# Patient Record
Sex: MALE | Race: WHITE | NOT HISPANIC OR LATINO | URBAN - METROPOLITAN AREA
[De-identification: names, ages, dates, MRNs, and addresses within clinical notes are randomized per-mention and may not be internally consistent; named-entity substitution may affect disease eponyms.]

---

## 2019-06-28 ENCOUNTER — OFFICE VISIT (OUTPATIENT)
Dept: INTERNAL MEDICINE | Facility: CLINIC | Age: 39
End: 2019-06-28
Payer: COMMERCIAL

## 2019-06-28 VITALS
BODY MASS INDEX: 33 KG/M2 | HEART RATE: 102 BPM | WEIGHT: 235.69 LBS | HEIGHT: 71 IN | OXYGEN SATURATION: 96 % | TEMPERATURE: 98 F | SYSTOLIC BLOOD PRESSURE: 128 MMHG | DIASTOLIC BLOOD PRESSURE: 80 MMHG

## 2019-06-28 DIAGNOSIS — F98.8 ATTENTION DEFICIT DISORDER, UNSPECIFIED HYPERACTIVITY PRESENCE: Primary | ICD-10-CM

## 2019-06-28 PROCEDURE — 3008F PR BODY MASS INDEX (BMI) DOCUMENTED: ICD-10-PCS | Mod: CPTII,S$GLB,, | Performed by: FAMILY MEDICINE

## 2019-06-28 PROCEDURE — 3008F BODY MASS INDEX DOCD: CPT | Mod: CPTII,S$GLB,, | Performed by: FAMILY MEDICINE

## 2019-06-28 PROCEDURE — 99999 PR PBB SHADOW E&M-EST. PATIENT-LVL III: ICD-10-PCS | Mod: PBBFAC,,, | Performed by: FAMILY MEDICINE

## 2019-06-28 PROCEDURE — 99203 PR OFFICE/OUTPT VISIT, NEW, LEVL III, 30-44 MIN: ICD-10-PCS | Mod: S$GLB,,, | Performed by: FAMILY MEDICINE

## 2019-06-28 PROCEDURE — 99999 PR PBB SHADOW E&M-EST. PATIENT-LVL III: CPT | Mod: PBBFAC,,, | Performed by: FAMILY MEDICINE

## 2019-06-28 PROCEDURE — 99203 OFFICE O/P NEW LOW 30 MIN: CPT | Mod: S$GLB,,, | Performed by: FAMILY MEDICINE

## 2019-06-28 RX ORDER — DEXTROAMPHETAMINE SULFATE 10 MG/1
10 CAPSULE, EXTENDED RELEASE ORAL DAILY
Qty: 60 CAPSULE | Refills: 0 | Status: SHIPPED | OUTPATIENT
Start: 2019-06-28 | End: 2019-07-17 | Stop reason: SDUPTHER

## 2019-06-28 RX ORDER — DEXTROAMPHETAMINE SULFATE 10 MG/1
CAPSULE, EXTENDED RELEASE ORAL
Refills: 0 | COMMUNITY
Start: 2019-05-09 | End: 2019-06-28 | Stop reason: SDUPTHER

## 2019-06-28 NOTE — PROGRESS NOTES
Subjective:       Patient ID: Theron Manrique is a 39 y.o. male.    Chief Complaint: Establish Care    Establish Care:      Pt is a 39 year old who is here for ADD check. Pt in Dexedrine 10 mg. Pt does not take     Review of Systems   Constitutional: Negative for activity change and unexpected weight change.   HENT: Negative for hearing loss, rhinorrhea and trouble swallowing.    Eyes: Negative for discharge and visual disturbance.   Respiratory: Negative for chest tightness and wheezing.    Cardiovascular: Negative for chest pain and palpitations.   Gastrointestinal: Negative for blood in stool, constipation, diarrhea and vomiting.   Endocrine: Negative for polydipsia and polyuria.   Genitourinary: Negative for difficulty urinating, hematuria and urgency.   Musculoskeletal: Negative for arthralgias, joint swelling and neck pain.   Neurological: Negative for weakness and headaches.   Psychiatric/Behavioral: Negative for confusion and dysphoric mood.       Objective:      Physical Exam   Constitutional: He is oriented to person, place, and time. He appears well-developed and well-nourished.   Cardiovascular: Normal rate and regular rhythm.   No murmur heard.  Pulmonary/Chest: Effort normal and breath sounds normal. No stridor. He has no wheezes.   Abdominal: Soft. Bowel sounds are normal.   Neurological: He is alert and oriented to person, place, and time.   Skin: Skin is warm and dry.   Psychiatric: He has a normal mood and affect. His behavior is normal.       Assessment:       1. Attention deficit disorder, unspecified hyperactivity presence        Plan:       Attention deficit disorder, unspecified hyperactivity presence  Comments:  Will continue with the Dexedrube 10 mg bid    Other orders  -     dextroamphetamine (DEXEDRINE SPANSULE) 10 MG 24 hr capsule; Take 1 capsule (10 mg total) by mouth once daily.  Dispense: 60 capsule; Refill: 0

## 2019-07-01 ENCOUNTER — TELEPHONE (OUTPATIENT)
Dept: INTERNAL MEDICINE | Facility: CLINIC | Age: 39
End: 2019-07-01

## 2019-07-01 NOTE — TELEPHONE ENCOUNTER
----- Message from Estefany Goode sent at 7/1/2019 10:00 AM CDT -----  Contact: Self  Type:  Patient Returning Call    Who Called:Theron  Who Left Message for Patient:Stas  Does the patient know what this is regarding?:  Would the patient rather a call back or a response via [facility]? call  Best Call Back Number:[phone]  Additional Information:

## 2019-07-01 NOTE — TELEPHONE ENCOUNTER
----- Message from Sanam Wiley sent at 7/1/2019  9:06 AM CDT -----  Contact: Pt   Pt is calling regarding requesting to have nurse call pt back. Pt states that call is concnenring medication  dextroamphetamine (DEXEDRINE SPANSULE) 10 MG 24 hr capsule. Pt states that medication is suppose to be written for twice a day, but was sent to pharmacy to be taken once a daily. Pt states that pharmacy advised pt that script is not covered by insurance less written for twice a day. ..[phone] (home)     [facility] [id number] [place] [id number] [employer] AT [facility]  [address] [employer] [address]  Phone: [phone] Fax: [phone]        .Thank You  Ary Wiley

## 2019-07-08 ENCOUNTER — TELEPHONE (OUTPATIENT)
Dept: INTERNAL MEDICINE | Facility: CLINIC | Age: 39
End: 2019-07-08

## 2019-07-08 NOTE — TELEPHONE ENCOUNTER
----- Message from Scottie Hunter sent at 7/8/2019  9:16 AM CDT -----  Contact: [phone]  Pt would like a call back from nurse regarding confirmation on a prescription . Please call back at [phone].     Thank You,   Scottie Hunter

## 2019-07-09 ENCOUNTER — TELEPHONE (OUTPATIENT)
Dept: INTERNAL MEDICINE | Facility: CLINIC | Age: 39
End: 2019-07-09

## 2019-07-09 NOTE — TELEPHONE ENCOUNTER
----- Message from Annemarie Ramirez sent at 7/9/2019  3:58 PM CDT -----  Contact: pt  The pt states he needs a script for Dexedrine 10mg 2x daily sent to [facility], no additional info given and can be reached at [phone]///thxMW

## 2019-07-09 NOTE — TELEPHONE ENCOUNTER
I s/w pt in regards to medication confirmation, pt informed me that his pharmacy faxed over paperwork to confirm instructions, I informed pt I would check fax and give pharmacy a call or fax them back information. Pt thanked me for calling. //BJ

## 2019-07-17 ENCOUNTER — TELEPHONE (OUTPATIENT)
Dept: INTERNAL MEDICINE | Facility: CLINIC | Age: 39
End: 2019-07-17

## 2019-07-17 RX ORDER — DEXTROAMPHETAMINE SULFATE 10 MG/1
10 CAPSULE, EXTENDED RELEASE ORAL 2 TIMES DAILY
Qty: 60 CAPSULE | Refills: 0 | Status: SHIPPED | OUTPATIENT
Start: 2019-07-17 | End: 2019-08-14 | Stop reason: SDUPTHER

## 2019-07-17 NOTE — TELEPHONE ENCOUNTER
Patient also reported the rx is written incorrectly. He has always taken this med twice a day, not once daily.    Informed patient I would check with Dr. Flores about revising rx.

## 2019-07-17 NOTE — TELEPHONE ENCOUNTER
Per [facility], no prior auth necessary for the following if quantity dispensed is changed to 34 capsules or less and by prescribing physician and member utilizes [facility] according to member's benefit plan:        Thanked [facility] and ended call.

## 2019-07-17 NOTE — TELEPHONE ENCOUNTER
----- Message from Annemarie Ramirez sent at 7/17/2019 11:47 AM CDT -----  Contact: pt  Type:  Patient Returning Call    Who Called: The pt  Who Left Message for Patient: Hermes  Does the patient know what this is regarding?: no  Would the patient rather a call back or a response via [facility]? Call back  Best Call Back Number: [phone]  Additional Information: n/a

## 2019-07-17 NOTE — TELEPHONE ENCOUNTER
Left voicemail for patient to call back to let me know if he has separate pharmacy benefits card to complete prior auth.

## 2019-08-14 NOTE — TELEPHONE ENCOUNTER
Confirmed patient's preferred pharmacy, allergies, and current medications as per Epic.    Forwarded to Dr. Flores for review.

## 2019-08-15 RX ORDER — DEXTROAMPHETAMINE SULFATE 10 MG/1
10 CAPSULE, EXTENDED RELEASE ORAL 2 TIMES DAILY
Qty: 60 CAPSULE | Refills: 0 | Status: SHIPPED | OUTPATIENT
Start: 2019-08-15 | End: 2019-09-11 | Stop reason: SDUPTHER

## 2019-09-06 ENCOUNTER — LAB VISIT (OUTPATIENT)
Dept: LAB | Facility: HOSPITAL | Age: 39
End: 2019-09-06
Attending: FAMILY MEDICINE
Payer: COMMERCIAL

## 2019-09-06 ENCOUNTER — OFFICE VISIT (OUTPATIENT)
Dept: INTERNAL MEDICINE | Facility: CLINIC | Age: 39
End: 2019-09-06
Payer: COMMERCIAL

## 2019-09-06 VITALS
TEMPERATURE: 97 F | DIASTOLIC BLOOD PRESSURE: 74 MMHG | WEIGHT: 236.31 LBS | HEART RATE: 74 BPM | HEIGHT: 71 IN | SYSTOLIC BLOOD PRESSURE: 108 MMHG | BODY MASS INDEX: 33.08 KG/M2 | OXYGEN SATURATION: 98 %

## 2019-09-06 DIAGNOSIS — Z00.00 ANNUAL PHYSICAL EXAM: ICD-10-CM

## 2019-09-06 DIAGNOSIS — Z00.00 ANNUAL PHYSICAL EXAM: Primary | ICD-10-CM

## 2019-09-06 LAB
ALBUMIN SERPL BCP-MCNC: 4.5 G/DL (ref 3.5–5.2)
ALP SERPL-CCNC: 50 U/L (ref 55–135)
ALT SERPL W/O P-5'-P-CCNC: 33 U/L (ref 10–44)
ANION GAP SERPL CALC-SCNC: 10 MMOL/L (ref 8–16)
AST SERPL-CCNC: 13 U/L (ref 10–40)
BASOPHILS # BLD AUTO: 0.04 K/UL (ref 0–0.2)
BASOPHILS NFR BLD: 0.7 % (ref 0–1.9)
BILIRUB SERPL-MCNC: 1.4 MG/DL (ref 0.1–1)
BUN SERPL-MCNC: 16 MG/DL (ref 6–20)
CALCIUM SERPL-MCNC: 10.2 MG/DL (ref 8.7–10.5)
CHLORIDE SERPL-SCNC: 104 MMOL/L (ref 95–110)
CHOLEST SERPL-MCNC: 157 MG/DL (ref 120–199)
CHOLEST/HDLC SERPL: 3.7 {RATIO} (ref 2–5)
CO2 SERPL-SCNC: 26 MMOL/L (ref 23–29)
CREAT SERPL-MCNC: 1 MG/DL (ref 0.5–1.4)
DIFFERENTIAL METHOD: NORMAL
EOSINOPHIL # BLD AUTO: 0.1 K/UL (ref 0–0.5)
EOSINOPHIL NFR BLD: 1.9 % (ref 0–8)
ERYTHROCYTE [DISTWIDTH] IN BLOOD BY AUTOMATED COUNT: 12.9 % (ref 11.5–14.5)
EST. GFR  (AFRICAN AMERICAN): >60 ML/MIN/1.73 M^2
EST. GFR  (NON AFRICAN AMERICAN): >60 ML/MIN/1.73 M^2
GLUCOSE SERPL-MCNC: 81 MG/DL (ref 70–110)
HCT VFR BLD AUTO: 45.7 % (ref 40–54)
HDLC SERPL-MCNC: 42 MG/DL (ref 40–75)
HDLC SERPL: 26.8 % (ref 20–50)
HGB BLD-MCNC: 14.9 G/DL (ref 14–18)
IMM GRANULOCYTES # BLD AUTO: 0.02 K/UL (ref 0–0.04)
IMM GRANULOCYTES NFR BLD AUTO: 0.4 % (ref 0–0.5)
LDLC SERPL CALC-MCNC: 89.4 MG/DL (ref 63–159)
LYMPHOCYTES # BLD AUTO: 2.4 K/UL (ref 1–4.8)
LYMPHOCYTES NFR BLD: 41.5 % (ref 18–48)
MCH RBC QN AUTO: 28.2 PG (ref 27–31)
MCHC RBC AUTO-ENTMCNC: 32.6 G/DL (ref 32–36)
MCV RBC AUTO: 87 FL (ref 82–98)
MONOCYTES # BLD AUTO: 0.6 K/UL (ref 0.3–1)
MONOCYTES NFR BLD: 10.2 % (ref 4–15)
NEUTROPHILS # BLD AUTO: 2.6 K/UL (ref 1.8–7.7)
NEUTROPHILS NFR BLD: 45.3 % (ref 38–73)
NONHDLC SERPL-MCNC: 115 MG/DL
NRBC BLD-RTO: 0 /100 WBC
PLATELET # BLD AUTO: 226 K/UL (ref 150–350)
PMV BLD AUTO: 10.7 FL (ref 9.2–12.9)
POTASSIUM SERPL-SCNC: 4.5 MMOL/L (ref 3.5–5.1)
PROT SERPL-MCNC: 7.4 G/DL (ref 6–8.4)
RBC # BLD AUTO: 5.28 M/UL (ref 4.6–6.2)
SODIUM SERPL-SCNC: 140 MMOL/L (ref 136–145)
TRIGL SERPL-MCNC: 128 MG/DL (ref 30–150)
WBC # BLD AUTO: 5.68 K/UL (ref 3.9–12.7)

## 2019-09-06 PROCEDURE — 99999 PR PBB SHADOW E&M-EST. PATIENT-LVL III: ICD-10-PCS | Mod: PBBFAC,,, | Performed by: FAMILY MEDICINE

## 2019-09-06 PROCEDURE — 36415 COLL VENOUS BLD VENIPUNCTURE: CPT

## 2019-09-06 PROCEDURE — 99395 PREV VISIT EST AGE 18-39: CPT | Mod: S$GLB,,, | Performed by: FAMILY MEDICINE

## 2019-09-06 PROCEDURE — 99395 PR PREVENTIVE VISIT,EST,18-39: ICD-10-PCS | Mod: S$GLB,,, | Performed by: FAMILY MEDICINE

## 2019-09-06 PROCEDURE — 80061 LIPID PANEL: CPT

## 2019-09-06 PROCEDURE — 99999 PR PBB SHADOW E&M-EST. PATIENT-LVL III: CPT | Mod: PBBFAC,,, | Performed by: FAMILY MEDICINE

## 2019-09-06 PROCEDURE — 80053 COMPREHEN METABOLIC PANEL: CPT

## 2019-09-06 PROCEDURE — 85025 COMPLETE CBC W/AUTO DIFF WBC: CPT

## 2019-09-06 NOTE — PROGRESS NOTES
Subjective:       Patient ID: Theron Manrique is a 39 y.o. male.    Chief Complaint: Follow-up (3 mon)    Annual Exam:      Pt is over all healthy. Pt does have ADD and is on dextroampetamine    Review of Systems   Constitutional: Negative.    Respiratory: Negative.    Cardiovascular: Negative.    Genitourinary: Negative.    Musculoskeletal: Negative.    Neurological: Negative.    Hematological: Negative.    Psychiatric/Behavioral: Negative.        Objective:      Physical Exam   Constitutional: He is oriented to person, place, and time. He appears well-developed and well-nourished.   Cardiovascular: Normal rate.   Pulmonary/Chest: Effort normal and breath sounds normal. No stridor. He has no wheezes.   Abdominal: Soft. Bowel sounds are normal. He exhibits no mass. There is no guarding.   Neurological: He is alert and oriented to person, place, and time.   Skin: Skin is warm and dry.   Psychiatric: He has a normal mood and affect.       Assessment:       1. Annual physical exam        Plan:       Annual physical exam  -     CBC auto differential; Future; Expected date: 09/06/2019  -     Comprehensive metabolic panel; Future; Expected date: 09/06/2019  -     Lipid panel; Future; Expected date: 09/06/2019

## 2019-09-12 RX ORDER — DEXTROAMPHETAMINE SULFATE 10 MG/1
10 CAPSULE, EXTENDED RELEASE ORAL 2 TIMES DAILY
Qty: 60 CAPSULE | Refills: 0 | Status: SHIPPED | OUTPATIENT
Start: 2019-09-12 | End: 2019-10-14 | Stop reason: SDUPTHER

## 2019-10-14 RX ORDER — DEXTROAMPHETAMINE SULFATE 10 MG/1
10 CAPSULE, EXTENDED RELEASE ORAL 2 TIMES DAILY
Qty: 60 CAPSULE | Refills: 0 | Status: SHIPPED | OUTPATIENT
Start: 2019-10-14 | End: 2019-11-13 | Stop reason: SDUPTHER

## 2019-11-13 ENCOUNTER — OFFICE VISIT (OUTPATIENT)
Dept: DERMATOLOGY | Facility: CLINIC | Age: 39
End: 2019-11-13
Payer: COMMERCIAL

## 2019-11-13 DIAGNOSIS — L98.9 SKIN LESION: Primary | ICD-10-CM

## 2019-11-13 PROCEDURE — 17110 PR DESTRUCTION BENIGN LESIONS UP TO 14: ICD-10-PCS | Mod: S$GLB,,, | Performed by: STUDENT IN AN ORGANIZED HEALTH CARE EDUCATION/TRAINING PROGRAM

## 2019-11-13 PROCEDURE — 99999 PR PBB SHADOW E&M-EST. PATIENT-LVL II: CPT | Mod: PBBFAC,,, | Performed by: STUDENT IN AN ORGANIZED HEALTH CARE EDUCATION/TRAINING PROGRAM

## 2019-11-13 PROCEDURE — 99999 PR PBB SHADOW E&M-EST. PATIENT-LVL II: ICD-10-PCS | Mod: PBBFAC,,, | Performed by: STUDENT IN AN ORGANIZED HEALTH CARE EDUCATION/TRAINING PROGRAM

## 2019-11-13 PROCEDURE — 99202 PR OFFICE/OUTPT VISIT, NEW, LEVL II, 15-29 MIN: ICD-10-PCS | Mod: 25,S$GLB,, | Performed by: STUDENT IN AN ORGANIZED HEALTH CARE EDUCATION/TRAINING PROGRAM

## 2019-11-13 PROCEDURE — 99202 OFFICE O/P NEW SF 15 MIN: CPT | Mod: 25,S$GLB,, | Performed by: STUDENT IN AN ORGANIZED HEALTH CARE EDUCATION/TRAINING PROGRAM

## 2019-11-13 PROCEDURE — 17110 DESTRUCTION B9 LES UP TO 14: CPT | Mod: S$GLB,,, | Performed by: STUDENT IN AN ORGANIZED HEALTH CARE EDUCATION/TRAINING PROGRAM

## 2019-11-13 NOTE — PATIENT INSTRUCTIONS

## 2019-11-13 NOTE — PROGRESS NOTES
Subjective:       Patient ID:  Theron Manrique is a 39 y.o. male who presents for   Chief Complaint   Patient presents with    Mole     to chest pt unsure how long it has been there.     History of Present Illness: The patient presents with chief complaint of a skin lesion.   Location: chest  Duration: unknown, recently noticed lesion weeks ago  Signs/Symptoms: none  Prior treatments: none  Denies and history of Family hx of skin cancer      Review of Systems   Skin: Negative for itching, rash and dry skin.        Objective:    Physical Exam   Constitutional: He appears well-developed and well-nourished. No distress.   Neurological: He is alert and oriented to person, place, and time. He is not disoriented.   Psychiatric: He has a normal mood and affect.   Skin:   Areas Examined (abnormalities noted in diagram):   Head / Face Inspection Performed  Neck Inspection Performed  Chest / Axilla Inspection Performed  Abdomen Inspection Performed  Back Inspection Performed  RUE Inspected  LUE Inspection Performed              Diagram Legend     Erythematous scaling macule/papule c/w actinic keratosis       Vascular papule c/w angioma      Pigmented verrucoid papule/plaque c/w seborrheic keratosis      Yellow umbilicated papule c/w sebaceous hyperplasia      Irregularly shaped tan macule c/w lentigo     1-2 mm smooth white papules consistent with Milia      Movable subcutaneous cyst with punctum c/w epidermal inclusion cyst      Subcutaneous movable cyst c/w pilar cyst      Firm pink to brown papule c/w dermatofibroma      Pedunculated fleshy papule(s) c/w skin tag(s)      Evenly pigmented macule c/w junctional nevus     Mildly variegated pigmented, slightly irregular-bordered macule c/w mildly atypical nevus      Flesh colored to evenly pigmented papule c/w intradermal nevus       Pink pearly papule/plaque c/w basal cell carcinoma      Erythematous hyperkeratotic cursted plaque c/w SCC      Surgical scar with no sign  of skin cancer recurrence      Open and closed comedones      Inflammatory papules and pustules      Verrucoid papule consistent consistent with wart     Erythematous eczematous patches and plaques     Dystrophic onycholytic nail with subungual debris c/w onychomycosis     Umbilicated papule    Erythematous-base heme-crusted tan verrucoid plaque consistent with inflamed seborrheic keratosis     Erythematous Silvery Scaling Plaque c/w Psoriasis     See annotation      Assessment / Plan:        Skin lesion  Cryosurgery procedure note:    Verbal consent from the patient is obtained including, but not limited to, risk of hypopigmentation/hyperpigmentation, scar, recurrence of lesion. Liquid nitrogen cryosurgery is applied to 1 lesions to produce a freeze injury. The patient is aware that blisters may form and is instructed on wound care with gentle cleansing and use of vaseline ointment to keep moist until healed. The patient is supplied a handout on cryosurgery and is instructed to call if lesions do not completely resolve.             Follow up if symptoms worsen or fail to improve.

## 2019-11-14 RX ORDER — DEXTROAMPHETAMINE SULFATE 10 MG/1
10 CAPSULE, EXTENDED RELEASE ORAL 2 TIMES DAILY
Qty: 60 CAPSULE | Refills: 0 | Status: SHIPPED | OUTPATIENT
Start: 2019-11-14 | End: 2019-12-12 | Stop reason: SDUPTHER

## 2019-12-09 PROBLEM — Z00.00 ANNUAL PHYSICAL EXAM: Status: RESOLVED | Noted: 2019-09-06 | Resolved: 2019-12-09

## 2019-12-13 RX ORDER — DEXTROAMPHETAMINE SULFATE 10 MG/1
10 CAPSULE, EXTENDED RELEASE ORAL 2 TIMES DAILY
Qty: 60 CAPSULE | Refills: 0 | Status: SHIPPED | OUTPATIENT
Start: 2019-12-13 | End: 2020-01-10 | Stop reason: SDUPTHER

## 2020-01-10 ENCOUNTER — OFFICE VISIT (OUTPATIENT)
Dept: INTERNAL MEDICINE | Facility: CLINIC | Age: 40
End: 2020-01-10
Payer: COMMERCIAL

## 2020-01-10 VITALS
WEIGHT: 241.63 LBS | OXYGEN SATURATION: 98 % | HEIGHT: 71 IN | TEMPERATURE: 98 F | DIASTOLIC BLOOD PRESSURE: 72 MMHG | BODY MASS INDEX: 33.83 KG/M2 | HEART RATE: 97 BPM | SYSTOLIC BLOOD PRESSURE: 122 MMHG

## 2020-01-10 DIAGNOSIS — F98.8 ATTENTION DEFICIT DISORDER, UNSPECIFIED HYPERACTIVITY PRESENCE: Primary | ICD-10-CM

## 2020-01-10 PROCEDURE — 99213 OFFICE O/P EST LOW 20 MIN: CPT | Mod: S$GLB,,, | Performed by: FAMILY MEDICINE

## 2020-01-10 PROCEDURE — 99213 PR OFFICE/OUTPT VISIT, EST, LEVL III, 20-29 MIN: ICD-10-PCS | Mod: S$GLB,,, | Performed by: FAMILY MEDICINE

## 2020-01-10 PROCEDURE — 3008F BODY MASS INDEX DOCD: CPT | Mod: CPTII,S$GLB,, | Performed by: FAMILY MEDICINE

## 2020-01-10 PROCEDURE — 99999 PR PBB SHADOW E&M-EST. PATIENT-LVL III: CPT | Mod: PBBFAC,,, | Performed by: FAMILY MEDICINE

## 2020-01-10 PROCEDURE — 99999 PR PBB SHADOW E&M-EST. PATIENT-LVL III: ICD-10-PCS | Mod: PBBFAC,,, | Performed by: FAMILY MEDICINE

## 2020-01-10 PROCEDURE — 3008F PR BODY MASS INDEX (BMI) DOCUMENTED: ICD-10-PCS | Mod: CPTII,S$GLB,, | Performed by: FAMILY MEDICINE

## 2020-01-10 RX ORDER — DEXTROAMPHETAMINE SULFATE 10 MG/1
10 CAPSULE, EXTENDED RELEASE ORAL 2 TIMES DAILY
Qty: 60 CAPSULE | Refills: 0 | Status: SHIPPED | OUTPATIENT
Start: 2020-01-10 | End: 2020-02-13 | Stop reason: SDUPTHER

## 2020-01-10 NOTE — PROGRESS NOTES
Subjective:       Patient ID: Theron Manrique is a 39 y.o. male.    Chief Complaint: Follow-up (4 mon)    F/U:     Pt is a 39 year old who is here for ADD eval. Pt is doing well. No issues.     Review of Systems   Constitutional: Negative.    HENT: Negative.    Respiratory: Negative.    Cardiovascular: Negative.    Gastrointestinal: Negative.    Genitourinary: Negative.    Skin: Negative.    Neurological: Negative.    Psychiatric/Behavioral: Negative.        Objective:      Physical Exam   Constitutional: He is oriented to person, place, and time. He appears well-developed and well-nourished.   Cardiovascular: Normal rate and regular rhythm. Exam reveals no friction rub.   No murmur heard.  Pulmonary/Chest: Effort normal and breath sounds normal. No stridor. He has no wheezes.   Abdominal: Soft. Bowel sounds are normal.   Neurological: He is alert and oriented to person, place, and time.   Psychiatric: He has a normal mood and affect. His behavior is normal.       Assessment:       1. Attention deficit disorder, unspecified hyperactivity presence        Plan:       Attention deficit disorder, unspecified hyperactivity presence  Comments:  Will continue with the Dexedrine    Other orders  -     dextroamphetamine (DEXEDRINE SPANSULE) 10 MG 24 hr capsule; Take 1 capsule (10 mg total) by mouth 2 (two) times daily.  Dispense: 60 capsule; Refill: 0

## 2020-02-13 RX ORDER — DEXTROAMPHETAMINE SULFATE 10 MG/1
10 CAPSULE, EXTENDED RELEASE ORAL 2 TIMES DAILY
Qty: 60 CAPSULE | Refills: 0 | Status: SHIPPED | OUTPATIENT
Start: 2020-02-13 | End: 2020-03-13 | Stop reason: SDUPTHER

## 2020-03-15 RX ORDER — DEXTROAMPHETAMINE SULFATE 10 MG/1
10 CAPSULE, EXTENDED RELEASE ORAL 2 TIMES DAILY
Qty: 60 CAPSULE | Refills: 0 | Status: SHIPPED | OUTPATIENT
Start: 2020-03-15 | End: 2020-04-14 | Stop reason: SDUPTHER

## 2020-04-14 RX ORDER — DEXTROAMPHETAMINE SULFATE 10 MG/1
10 CAPSULE, EXTENDED RELEASE ORAL 2 TIMES DAILY
Qty: 60 CAPSULE | Refills: 0 | Status: SHIPPED | OUTPATIENT
Start: 2020-04-14 | End: 2020-05-15 | Stop reason: SDUPTHER

## 2020-05-15 ENCOUNTER — OFFICE VISIT (OUTPATIENT)
Dept: INTERNAL MEDICINE | Facility: CLINIC | Age: 40
End: 2020-05-15
Payer: COMMERCIAL

## 2020-05-15 DIAGNOSIS — F98.8 ATTENTION DEFICIT DISORDER, UNSPECIFIED HYPERACTIVITY PRESENCE: Primary | ICD-10-CM

## 2020-05-15 PROCEDURE — 99213 PR OFFICE/OUTPT VISIT, EST, LEVL III, 20-29 MIN: ICD-10-PCS | Mod: 95,,, | Performed by: FAMILY MEDICINE

## 2020-05-15 PROCEDURE — 99213 OFFICE O/P EST LOW 20 MIN: CPT | Mod: 95,,, | Performed by: FAMILY MEDICINE

## 2020-05-15 RX ORDER — DEXTROAMPHETAMINE SULFATE 10 MG/1
10 CAPSULE, EXTENDED RELEASE ORAL 2 TIMES DAILY
Qty: 60 CAPSULE | Refills: 0 | Status: SHIPPED | OUTPATIENT
Start: 2020-05-15 | End: 2020-06-18 | Stop reason: SDUPTHER

## 2020-05-15 NOTE — PROGRESS NOTES
Subjective:       Patient ID: Threon Manrique is a 40 y.o. male.    Chief Complaint: No chief complaint on file.    The patient location is:Home  The chief complaint leading to consultation is: ADD  Visit type: ADD med  Total time spent with patient: 5 min  Each patient to whom he or she provides medical services by telemedicine is:  (1) informed of the relationship between the physician and patient and the respective role of any other health care provider with respect to management of the patient; and (2) notified that he or she may decline to receive medical services by telemedicine and may withdraw from such care at any time.    Notes:       Pt is a 40 year old seen for ADD medication refill. Pt is on Dexedrine and doing well.    Review of Systems   Constitutional: Negative.    Respiratory: Negative.    Cardiovascular: Negative.    Neurological: Negative.    Psychiatric/Behavioral: Negative.        Objective:      Physical Exam   Constitutional: He is oriented to person, place, and time. He appears well-developed and well-nourished.   Neurological: He is alert and oriented to person, place, and time.   Psychiatric: He has a normal mood and affect. His behavior is normal.       Assessment:       1. Attention deficit disorder, unspecified hyperactivity presence        Plan:       Attention deficit disorder, unspecified hyperactivity presence  Comments:  Continue with the Dexedrine    Other orders  -     dextroamphetamine (DEXEDRINE SPANSULE) 10 MG 24 hr capsule; Take 1 capsule (10 mg total) by mouth 2 (two) times daily.  Dispense: 60 capsule; Refill: 0       Consult Start Time: 05/15/2020 13:00  Consult End Time: 05/15/2020 13:05

## 2020-06-19 RX ORDER — DEXTROAMPHETAMINE SULFATE 10 MG/1
10 CAPSULE, EXTENDED RELEASE ORAL 2 TIMES DAILY
Qty: 60 CAPSULE | Refills: 0 | Status: SHIPPED | OUTPATIENT
Start: 2020-06-19 | End: 2020-07-17 | Stop reason: SDUPTHER

## 2020-07-17 RX ORDER — DEXTROAMPHETAMINE SULFATE 10 MG/1
10 CAPSULE, EXTENDED RELEASE ORAL 2 TIMES DAILY
Qty: 60 CAPSULE | Refills: 0 | Status: SHIPPED | OUTPATIENT
Start: 2020-07-17 | End: 2020-08-17 | Stop reason: SDUPTHER

## 2020-08-17 RX ORDER — DEXTROAMPHETAMINE SULFATE 10 MG/1
10 CAPSULE, EXTENDED RELEASE ORAL 2 TIMES DAILY
Qty: 60 CAPSULE | Refills: 0 | Status: SHIPPED | OUTPATIENT
Start: 2020-08-17 | End: 2020-09-18 | Stop reason: SDUPTHER

## 2020-09-18 ENCOUNTER — OFFICE VISIT (OUTPATIENT)
Dept: INTERNAL MEDICINE | Facility: CLINIC | Age: 40
End: 2020-09-18
Payer: COMMERCIAL

## 2020-09-18 DIAGNOSIS — F98.8 ATTENTION DEFICIT DISORDER, UNSPECIFIED HYPERACTIVITY PRESENCE: Primary | ICD-10-CM

## 2020-09-18 PROCEDURE — 99213 OFFICE O/P EST LOW 20 MIN: CPT | Mod: 95,,, | Performed by: FAMILY MEDICINE

## 2020-09-18 PROCEDURE — 99213 PR OFFICE/OUTPT VISIT, EST, LEVL III, 20-29 MIN: ICD-10-PCS | Mod: 95,,, | Performed by: FAMILY MEDICINE

## 2020-09-18 RX ORDER — DEXTROAMPHETAMINE SULFATE 10 MG/1
10 CAPSULE, EXTENDED RELEASE ORAL 2 TIMES DAILY
Qty: 60 CAPSULE | Refills: 0 | Status: SHIPPED | OUTPATIENT
Start: 2020-09-18 | End: 2020-10-15 | Stop reason: SDUPTHER

## 2020-09-18 NOTE — PROGRESS NOTES
Subjective:       Patient ID: Theron Manrique is a 40 y.o. male.    Chief Complaint: No chief complaint on file.    The patient location is: Home  The chief complaint leading to consultation is: med refill    Visit type: audiovisual    Face to Face time with patient: 10  minutes of total time spent on the encounter, which includes face to face time and non-face to face time preparing to see the patient (eg, review of tests), Obtaining and/or reviewing separately obtained history, Documenting clinical information in the electronic or other health record, Independently interpreting results (not separately reported) and communicating results to the patient/family/caregiver, or Care coordination (not separately reported).         Each patient to whom he or she provides medical services by telemedicine is:  (1) informed of the relationship between the physician and patient and the respective role of any other health care provider with respect to management of the patient; and (2) notified that he or she may decline to receive medical services by telemedicine and may withdraw from such care at any time.    Notes: Pt is a 40 year old with ADD and on Dexedrine 10 mg bid. Pt has done well. No evidence of abuse. Labs last year were good. Discussed transition in Dec    Review of Systems   Constitutional: Negative.  Negative for activity change and unexpected weight change.   HENT: Negative.  Negative for hearing loss, rhinorrhea and trouble swallowing.    Eyes: Negative for discharge and visual disturbance.   Respiratory: Negative.  Negative for chest tightness and wheezing.    Cardiovascular: Negative.  Negative for chest pain and palpitations.   Gastrointestinal: Negative.  Negative for blood in stool, constipation, diarrhea and vomiting.   Endocrine: Negative for polydipsia and polyuria.   Genitourinary: Negative for difficulty urinating, hematuria and urgency.   Musculoskeletal: Negative for arthralgias, joint swelling  and neck pain.   Integumentary:  Negative.   Neurological: Negative.  Negative for weakness and headaches.   Psychiatric/Behavioral: Negative.  Negative for confusion and dysphoric mood.         Objective:      Physical Exam  Constitutional:       Appearance: Normal appearance.   Neurological:      General: No focal deficit present.      Mental Status: He is alert and oriented to person, place, and time.         Assessment:       1. Attention deficit disorder, unspecified hyperactivity presence        Plan:       Attention deficit disorder, unspecified hyperactivity presence  Comments:  Continue with Dexedrine 10 mg bid    Other orders  -     dextroamphetamine (DEXEDRINE SPANSULE) 10 MG 24 hr capsule; Take 1 capsule (10 mg total) by mouth 2 (two) times a day.  Dispense: 60 capsule; Refill: 0

## 2020-10-15 RX ORDER — DEXTROAMPHETAMINE SULFATE 10 MG/1
10 CAPSULE, EXTENDED RELEASE ORAL 2 TIMES DAILY
Qty: 60 CAPSULE | Refills: 0 | Status: SHIPPED | OUTPATIENT
Start: 2020-10-15 | End: 2020-11-11 | Stop reason: SDUPTHER

## 2020-12-24 ENCOUNTER — LAB VISIT (OUTPATIENT)
Dept: LAB | Facility: HOSPITAL | Age: 40
End: 2020-12-24
Attending: FAMILY MEDICINE
Payer: COMMERCIAL

## 2020-12-24 ENCOUNTER — OFFICE VISIT (OUTPATIENT)
Dept: INTERNAL MEDICINE | Facility: CLINIC | Age: 40
End: 2020-12-24
Payer: COMMERCIAL

## 2020-12-24 VITALS
OXYGEN SATURATION: 97 % | SYSTOLIC BLOOD PRESSURE: 130 MMHG | WEIGHT: 249.31 LBS | BODY MASS INDEX: 34.9 KG/M2 | HEIGHT: 71 IN | TEMPERATURE: 99 F | HEART RATE: 104 BPM | DIASTOLIC BLOOD PRESSURE: 70 MMHG

## 2020-12-24 DIAGNOSIS — Z11.4 SCREENING FOR HIV (HUMAN IMMUNODEFICIENCY VIRUS): ICD-10-CM

## 2020-12-24 DIAGNOSIS — Z11.59 NEED FOR HEPATITIS C SCREENING TEST: ICD-10-CM

## 2020-12-24 DIAGNOSIS — F98.8 ATTENTION DEFICIT DISORDER, UNSPECIFIED HYPERACTIVITY PRESENCE: ICD-10-CM

## 2020-12-24 DIAGNOSIS — E66.9 OBESITY (BMI 30.0-34.9): ICD-10-CM

## 2020-12-24 DIAGNOSIS — Z00.00 ANNUAL PHYSICAL EXAM: Primary | ICD-10-CM

## 2020-12-24 DIAGNOSIS — Z00.00 ANNUAL PHYSICAL EXAM: ICD-10-CM

## 2020-12-24 PROBLEM — E66.811 OBESITY (BMI 30.0-34.9): Status: ACTIVE | Noted: 2020-12-24

## 2020-12-24 LAB
ALBUMIN SERPL BCP-MCNC: 4.3 G/DL (ref 3.5–5.2)
ALP SERPL-CCNC: 49 U/L (ref 55–135)
ALT SERPL W/O P-5'-P-CCNC: 45 U/L (ref 10–44)
ANION GAP SERPL CALC-SCNC: 9 MMOL/L (ref 8–16)
AST SERPL-CCNC: 13 U/L (ref 10–40)
BILIRUB SERPL-MCNC: 1.1 MG/DL (ref 0.1–1)
BUN SERPL-MCNC: 14 MG/DL (ref 6–20)
CALCIUM SERPL-MCNC: 9.3 MG/DL (ref 8.7–10.5)
CHLORIDE SERPL-SCNC: 105 MMOL/L (ref 95–110)
CHOLEST SERPL-MCNC: 175 MG/DL (ref 120–199)
CHOLEST/HDLC SERPL: 4.5 {RATIO} (ref 2–5)
CO2 SERPL-SCNC: 25 MMOL/L (ref 23–29)
CREAT SERPL-MCNC: 0.9 MG/DL (ref 0.5–1.4)
ERYTHROCYTE [DISTWIDTH] IN BLOOD BY AUTOMATED COUNT: 13 % (ref 11.5–14.5)
EST. GFR  (AFRICAN AMERICAN): >60 ML/MIN/1.73 M^2
EST. GFR  (NON AFRICAN AMERICAN): >60 ML/MIN/1.73 M^2
GLUCOSE SERPL-MCNC: 90 MG/DL (ref 70–110)
HCT VFR BLD AUTO: 45.6 % (ref 40–54)
HDLC SERPL-MCNC: 39 MG/DL (ref 40–75)
HDLC SERPL: 22.3 % (ref 20–50)
HGB BLD-MCNC: 14.6 G/DL (ref 14–18)
LDLC SERPL CALC-MCNC: 108 MG/DL (ref 63–159)
MCH RBC QN AUTO: 27.8 PG (ref 27–31)
MCHC RBC AUTO-ENTMCNC: 32 G/DL (ref 32–36)
MCV RBC AUTO: 87 FL (ref 82–98)
NONHDLC SERPL-MCNC: 136 MG/DL
PLATELET # BLD AUTO: 202 K/UL (ref 150–350)
PMV BLD AUTO: 10.8 FL (ref 9.2–12.9)
POTASSIUM SERPL-SCNC: 4.3 MMOL/L (ref 3.5–5.1)
PROT SERPL-MCNC: 7.3 G/DL (ref 6–8.4)
RBC # BLD AUTO: 5.25 M/UL (ref 4.6–6.2)
SODIUM SERPL-SCNC: 139 MMOL/L (ref 136–145)
TRIGL SERPL-MCNC: 140 MG/DL (ref 30–150)
WBC # BLD AUTO: 4.87 K/UL (ref 3.9–12.7)

## 2020-12-24 PROCEDURE — 36415 COLL VENOUS BLD VENIPUNCTURE: CPT

## 2020-12-24 PROCEDURE — 3008F PR BODY MASS INDEX (BMI) DOCUMENTED: ICD-10-PCS | Mod: CPTII,S$GLB,, | Performed by: FAMILY MEDICINE

## 2020-12-24 PROCEDURE — 1126F AMNT PAIN NOTED NONE PRSNT: CPT | Mod: S$GLB,,, | Performed by: FAMILY MEDICINE

## 2020-12-24 PROCEDURE — 1126F PR PAIN SEVERITY QUANTIFIED, NO PAIN PRESENT: ICD-10-PCS | Mod: S$GLB,,, | Performed by: FAMILY MEDICINE

## 2020-12-24 PROCEDURE — 86803 HEPATITIS C AB TEST: CPT

## 2020-12-24 PROCEDURE — 3008F BODY MASS INDEX DOCD: CPT | Mod: CPTII,S$GLB,, | Performed by: FAMILY MEDICINE

## 2020-12-24 PROCEDURE — 85027 COMPLETE CBC AUTOMATED: CPT

## 2020-12-24 PROCEDURE — 80053 COMPREHEN METABOLIC PANEL: CPT

## 2020-12-24 PROCEDURE — 99999 PR PBB SHADOW E&M-EST. PATIENT-LVL IV: CPT | Mod: PBBFAC,,, | Performed by: FAMILY MEDICINE

## 2020-12-24 PROCEDURE — 99395 PR PREVENTIVE VISIT,EST,18-39: ICD-10-PCS | Mod: S$GLB,,, | Performed by: FAMILY MEDICINE

## 2020-12-24 PROCEDURE — 99999 PR PBB SHADOW E&M-EST. PATIENT-LVL IV: ICD-10-PCS | Mod: PBBFAC,,, | Performed by: FAMILY MEDICINE

## 2020-12-24 PROCEDURE — 86703 HIV-1/HIV-2 1 RESULT ANTBDY: CPT

## 2020-12-24 PROCEDURE — 99395 PREV VISIT EST AGE 18-39: CPT | Mod: S$GLB,,, | Performed by: FAMILY MEDICINE

## 2020-12-24 PROCEDURE — 80061 LIPID PANEL: CPT

## 2020-12-24 RX ORDER — DEXTROAMPHETAMINE SULFATE 10 MG/1
10 CAPSULE, EXTENDED RELEASE ORAL 2 TIMES DAILY
Qty: 60 CAPSULE | Refills: 0 | Status: SHIPPED | OUTPATIENT
Start: 2020-12-24 | End: 2021-01-25 | Stop reason: SDUPTHER

## 2020-12-24 NOTE — PROGRESS NOTES
Subjective:   Patient ID: Theron Manrique is a 40 y.o. male.  Chief Complaint:  Establish Care    Previous PCP Dr. Flores   Presents for annual physical exam   Last physical September 2019   CBC, CMP, lipids all acceptable   No family history colon or prostate cancer  No active  symptoms   Tetanus booster and flu vaccine up-to-date  No documented hepatitis or HIV screening    ADHD follow-up  On  Dexedrine Spansules 10 mg twice a day  Effective with symptoms controlled on present medication and dose helping compensate for deficits at work/school.  Duration is appropriate.    No mood instability, tics, or disordered sleep, or other apparent adverse effects.    Tolerating current treatment well.   No behaviors to suggest inappropriate use of prescribed medications.  [place] Board of Pharmacy Controlled Prescription Drug Monitoring database was queried and showed no activity to suggest abuse, diversion, or other inappropriate use of prescription medications.     No additional complaints concerns today.        Current Outpatient Medications:     dextroamphetamine (DEXEDRINE SPANSULE) 10 MG 24 hr capsule, Take 1 capsule (10 mg total) by mouth 2 (two) times a day., Disp: 60 capsule, Rfl: 0    Review of Systems   Constitutional: Negative for activity change, appetite change, chills, fatigue, fever and unexpected weight change.   HENT: Negative for congestion, dental problem, ear discharge, ear pain, hearing loss, postnasal drip, rhinorrhea, sinus pressure, sinus pain, sore throat and trouble swallowing.    Eyes: Negative for pain, discharge, redness and visual disturbance.   Respiratory: Negative for cough, chest tightness, shortness of breath and wheezing.    Cardiovascular: Negative for chest pain, palpitations and leg swelling.   Gastrointestinal: Negative for abdominal pain, blood in stool, constipation, diarrhea, nausea and vomiting.   Endocrine: Negative for polydipsia, polyphagia and polyuria.   Genitourinary:  "Negative for difficulty urinating, dysuria, flank pain, frequency, hematuria and urgency.   Musculoskeletal: Negative for arthralgias, joint swelling, myalgias and neck pain.   Skin: Negative for rash.   Neurological: Negative for dizziness, tremors, weakness, light-headedness and headaches.   Hematological: Negative for adenopathy.   Psychiatric/Behavioral: Negative for agitation, behavioral problems, confusion, decreased concentration, dysphoric mood, hallucinations and sleep disturbance. The patient is not nervous/anxious and is not hyperactive.      Objective:   /70 (BP Location: Right arm, Patient Position: Sitting, BP Method: Large (Manual))   Pulse 104   Temp 98.6 °F (37 °C) (Tympanic)   Ht 5' 11" (1.803 m)   Wt 113.1 kg (249 lb 5.4 oz)   SpO2 97%   BMI 34.78 kg/m²     Physical Exam  Vitals signs and nursing note reviewed.   Constitutional:       General: He is not in acute distress.     Appearance: Normal appearance. He is well-developed. He is obese.   HENT:      Right Ear: Hearing, tympanic membrane, ear canal and external ear normal.      Left Ear: Hearing, tympanic membrane, ear canal and external ear normal.      Nose: Nose normal. No mucosal edema, congestion or rhinorrhea.      Right Turbinates: Not enlarged or swollen.      Left Turbinates: Not enlarged or swollen.      Right Sinus: No maxillary sinus tenderness or frontal sinus tenderness.      Left Sinus: No maxillary sinus tenderness or frontal sinus tenderness.      Mouth/Throat:      Mouth: No oral lesions.      Pharynx: Oropharynx is clear. Uvula midline.      Tonsils: No tonsillar exudate.   Eyes:      General: No scleral icterus.        Right eye: No discharge.         Left eye: No discharge.      Conjunctiva/sclera: Conjunctivae normal.      Right eye: Right conjunctiva is not injected.      Left eye: Left conjunctiva is not injected.   Neck:      Thyroid: No thyroid mass, thyromegaly or thyroid tenderness.      Vascular: No JVD. "   Cardiovascular:      Rate and Rhythm: Normal rate and regular rhythm.      Pulses:           Radial pulses are 2+ on the right side and 2+ on the left side.      Heart sounds: Normal heart sounds. No murmur. No friction rub. No gallop.    Pulmonary:      Effort: Pulmonary effort is normal. No accessory muscle usage or respiratory distress.      Breath sounds: Normal breath sounds and air entry. No wheezing, rhonchi or rales.   Abdominal:      General: There is no distension.      Palpations: Abdomen is soft.      Tenderness: There is no abdominal tenderness. There is no guarding or rebound.   Musculoskeletal:      Right lower leg: No edema.      Left lower leg: No edema.   Lymphadenopathy:      Cervical: No cervical adenopathy.   Skin:     General: Skin is warm and dry.      Findings: No rash.   Neurological:      Mental Status: He is alert and oriented to person, place, and time.      Coordination: Coordination normal.      Gait: Gait normal.   Psychiatric:         Attention and Perception: Attention and perception normal.         Mood and Affect: Mood normal.         Speech: Speech normal.         Behavior: Behavior normal. Behavior is cooperative.         Thought Content: Thought content normal.         Cognition and Memory: Cognition and memory normal.         Judgment: Judgment normal.       Assessment:       ICD-10-CM ICD-9-CM   1. Annual physical exam  Z00.00 V70.0   2. Screening for HIV (human immunodeficiency virus)  Z11.4 V73.89   3. Need for hepatitis C screening test  Z11.59 V73.89   4. Attention deficit disorder, unspecified hyperactivity presence  F98.8 314.00   5. Obesity (BMI 30.0-34.9)  E66.9 278.00     Plan:   Annual physical exam  Screening for HIV (human immunodeficiency virus)  Need for hepatitis C screening test  -     CBC Without Differential; Future; Expected date: 12/24/2020  -     Comprehensive Metabolic Panel; Future; Expected date: 12/24/2020  -     Lipid Panel; Future; Expected date:  12/24/2020  -     HIV 1/2 Ag/Ab (4th Gen); Future; Expected date: 12/24/2020  -     Hepatitis C Antibody; Future; Expected date: 12/24/2020  Blood pressure normal.  BMI 35.  Remainder exam stable.    Check labs.  Treat as indicated.    Tetanus booster and flu vaccines up-to-date     Attention deficit disorder, unspecified hyperactivity presence  -     dextroamphetamine (DEXEDRINE SPANSULE) 10 MG 24 hr capsule; Take 1 capsule (10 mg total) by mouth 2 (two) times a day.  Dispense: 60 capsule; Refill: 0  ADHD, stable, no side effects, controlled on present medication  Continue stimulant at present dose  OK to refill monthly x 2    Obesity (BMI 30.0-34.9)  Discussed increased BMI, Increased health risks, Need for weight loss, Lifestyle modifications.    Follow-up 3 months   Okay for telemedicine visit if patient prefers.

## 2020-12-28 ENCOUNTER — TELEPHONE (OUTPATIENT)
Dept: INTERNAL MEDICINE | Facility: CLINIC | Age: 40
End: 2020-12-28

## 2020-12-28 LAB
HCV AB SERPL QL IA: NEGATIVE
HIV 1+2 AB+HIV1 P24 AG SERPL QL IA: NEGATIVE

## 2020-12-28 NOTE — TELEPHONE ENCOUNTER
----- Message from Chelsea Laurent sent at 12/28/2020  3:36 PM CST -----  Contact: Theron  Type:  Patient Returning Call    Who Called:Theron  Who Left Message for Patient: Unknown  Does the patient know what this is regarding?: Lab results  Would the patient rather a call back or a response via [facility]? either  Best Call Back Number: [phone] (home)   Additional Information:  Patient requested after 4 is a better time to call.      Thanks,  Chelsea Laurent

## 2021-01-25 DIAGNOSIS — F98.8 ATTENTION DEFICIT DISORDER, UNSPECIFIED HYPERACTIVITY PRESENCE: ICD-10-CM

## 2021-01-25 RX ORDER — DEXTROAMPHETAMINE SULFATE 10 MG/1
10 CAPSULE, EXTENDED RELEASE ORAL 2 TIMES DAILY
Qty: 60 CAPSULE | Refills: 0 | Status: SHIPPED | OUTPATIENT
Start: 2021-01-25 | End: 2021-03-25 | Stop reason: SDUPTHER

## 2021-03-25 ENCOUNTER — OFFICE VISIT (OUTPATIENT)
Dept: INTERNAL MEDICINE | Facility: CLINIC | Age: 41
End: 2021-03-25
Payer: COMMERCIAL

## 2021-03-25 DIAGNOSIS — F98.8 ATTENTION DEFICIT DISORDER, UNSPECIFIED HYPERACTIVITY PRESENCE: Primary | ICD-10-CM

## 2021-03-25 PROCEDURE — 99213 PR OFFICE/OUTPT VISIT, EST, LEVL III, 20-29 MIN: ICD-10-PCS | Mod: 95,,, | Performed by: FAMILY MEDICINE

## 2021-03-25 PROCEDURE — 99213 OFFICE O/P EST LOW 20 MIN: CPT | Mod: 95,,, | Performed by: FAMILY MEDICINE

## 2021-03-25 RX ORDER — DEXTROAMPHETAMINE SULFATE 10 MG/1
10 CAPSULE, EXTENDED RELEASE ORAL 2 TIMES DAILY
Qty: 60 CAPSULE | Refills: 0 | Status: SHIPPED | OUTPATIENT
Start: 2021-03-25 | End: 2021-04-23 | Stop reason: SDUPTHER

## 2021-04-23 DIAGNOSIS — F98.8 ATTENTION DEFICIT DISORDER, UNSPECIFIED HYPERACTIVITY PRESENCE: ICD-10-CM

## 2021-04-24 RX ORDER — DEXTROAMPHETAMINE SULFATE 10 MG/1
10 CAPSULE, EXTENDED RELEASE ORAL 2 TIMES DAILY
Qty: 60 CAPSULE | Refills: 0 | Status: SHIPPED | OUTPATIENT
Start: 2021-04-24 | End: 2021-05-24 | Stop reason: SDUPTHER

## 2021-05-23 DIAGNOSIS — F98.8 ATTENTION DEFICIT DISORDER, UNSPECIFIED HYPERACTIVITY PRESENCE: ICD-10-CM

## 2021-05-25 ENCOUNTER — PATIENT MESSAGE (OUTPATIENT)
Dept: INTERNAL MEDICINE | Facility: CLINIC | Age: 41
End: 2021-05-25

## 2021-05-25 RX ORDER — DEXTROAMPHETAMINE SULFATE 10 MG/1
10 CAPSULE, EXTENDED RELEASE ORAL 2 TIMES DAILY
Qty: 60 CAPSULE | Refills: 0 | OUTPATIENT
Start: 2021-05-25

## 2021-06-28 ENCOUNTER — PATIENT MESSAGE (OUTPATIENT)
Dept: INTERNAL MEDICINE | Facility: CLINIC | Age: 41
End: 2021-06-28

## 2021-06-28 ENCOUNTER — OFFICE VISIT (OUTPATIENT)
Dept: INTERNAL MEDICINE | Facility: CLINIC | Age: 41
End: 2021-06-28
Payer: COMMERCIAL

## 2021-06-28 DIAGNOSIS — F98.8 ATTENTION DEFICIT DISORDER, UNSPECIFIED HYPERACTIVITY PRESENCE: ICD-10-CM

## 2021-06-28 PROCEDURE — 99213 OFFICE O/P EST LOW 20 MIN: CPT | Mod: 95,,, | Performed by: FAMILY MEDICINE

## 2021-06-28 PROCEDURE — 99213 PR OFFICE/OUTPT VISIT, EST, LEVL III, 20-29 MIN: ICD-10-PCS | Mod: 95,,, | Performed by: FAMILY MEDICINE

## 2021-06-28 RX ORDER — DEXTROAMPHETAMINE SULFATE 10 MG/1
10 CAPSULE, EXTENDED RELEASE ORAL 2 TIMES DAILY
Qty: 60 CAPSULE | Refills: 0 | Status: SHIPPED | OUTPATIENT
Start: 2021-06-28 | End: 2021-07-27 | Stop reason: SDUPTHER

## 2021-09-15 ENCOUNTER — PATIENT MESSAGE (OUTPATIENT)
Dept: INTERNAL MEDICINE | Facility: CLINIC | Age: 41
End: 2021-09-15

## 2021-09-16 ENCOUNTER — OFFICE VISIT (OUTPATIENT)
Dept: INTERNAL MEDICINE | Facility: CLINIC | Age: 41
End: 2021-09-16
Payer: COMMERCIAL

## 2021-09-16 VITALS
TEMPERATURE: 98 F | DIASTOLIC BLOOD PRESSURE: 80 MMHG | SYSTOLIC BLOOD PRESSURE: 110 MMHG | HEIGHT: 71 IN | WEIGHT: 246.5 LBS | RESPIRATION RATE: 18 BRPM | HEART RATE: 91 BPM | BODY MASS INDEX: 34.51 KG/M2 | OXYGEN SATURATION: 97 %

## 2021-09-16 DIAGNOSIS — F98.8 ATTENTION DEFICIT DISORDER (ADD) WITHOUT HYPERACTIVITY: Primary | ICD-10-CM

## 2021-09-16 DIAGNOSIS — Z00.00 ROUTINE GENERAL MEDICAL EXAMINATION AT A HEALTH CARE FACILITY: Primary | ICD-10-CM

## 2021-09-16 PROCEDURE — 99999 PR PBB SHADOW E&M-EST. PATIENT-LVL III: CPT | Mod: PBBFAC,,, | Performed by: FAMILY MEDICINE

## 2021-09-16 PROCEDURE — 3008F PR BODY MASS INDEX (BMI) DOCUMENTED: ICD-10-PCS | Mod: CPTII,S$GLB,, | Performed by: FAMILY MEDICINE

## 2021-09-16 PROCEDURE — 3079F PR MOST RECENT DIASTOLIC BLOOD PRESSURE 80-89 MM HG: ICD-10-PCS | Mod: CPTII,S$GLB,, | Performed by: FAMILY MEDICINE

## 2021-09-16 PROCEDURE — 3079F DIAST BP 80-89 MM HG: CPT | Mod: CPTII,S$GLB,, | Performed by: FAMILY MEDICINE

## 2021-09-16 PROCEDURE — 1159F MED LIST DOCD IN RCRD: CPT | Mod: CPTII,S$GLB,, | Performed by: FAMILY MEDICINE

## 2021-09-16 PROCEDURE — 99999 PR PBB SHADOW E&M-EST. PATIENT-LVL III: ICD-10-PCS | Mod: PBBFAC,,, | Performed by: FAMILY MEDICINE

## 2021-09-16 PROCEDURE — 1159F PR MEDICATION LIST DOCUMENTED IN MEDICAL RECORD: ICD-10-PCS | Mod: CPTII,S$GLB,, | Performed by: FAMILY MEDICINE

## 2021-09-16 PROCEDURE — 3074F PR MOST RECENT SYSTOLIC BLOOD PRESSURE < 130 MM HG: ICD-10-PCS | Mod: CPTII,S$GLB,, | Performed by: FAMILY MEDICINE

## 2021-09-16 PROCEDURE — 1160F RVW MEDS BY RX/DR IN RCRD: CPT | Mod: CPTII,S$GLB,, | Performed by: FAMILY MEDICINE

## 2021-09-16 PROCEDURE — 3074F SYST BP LT 130 MM HG: CPT | Mod: CPTII,S$GLB,, | Performed by: FAMILY MEDICINE

## 2021-09-16 PROCEDURE — 99213 PR OFFICE/OUTPT VISIT, EST, LEVL III, 20-29 MIN: ICD-10-PCS | Mod: S$GLB,,, | Performed by: FAMILY MEDICINE

## 2021-09-16 PROCEDURE — 1160F PR REVIEW ALL MEDS BY PRESCRIBER/CLIN PHARMACIST DOCUMENTED: ICD-10-PCS | Mod: CPTII,S$GLB,, | Performed by: FAMILY MEDICINE

## 2021-09-16 PROCEDURE — 3008F BODY MASS INDEX DOCD: CPT | Mod: CPTII,S$GLB,, | Performed by: FAMILY MEDICINE

## 2021-09-16 PROCEDURE — 99213 OFFICE O/P EST LOW 20 MIN: CPT | Mod: S$GLB,,, | Performed by: FAMILY MEDICINE

## 2021-09-17 DIAGNOSIS — Z00.00 ROUTINE GENERAL MEDICAL EXAMINATION AT A HEALTH CARE FACILITY: Primary | ICD-10-CM

## 2021-10-06 DIAGNOSIS — F98.8 ATTENTION DEFICIT DISORDER, UNSPECIFIED HYPERACTIVITY PRESENCE: ICD-10-CM

## 2021-10-06 RX ORDER — DEXTROAMPHETAMINE SULFATE 10 MG/1
10 CAPSULE, EXTENDED RELEASE ORAL 2 TIMES DAILY
Qty: 60 CAPSULE | Refills: 0 | Status: SHIPPED | OUTPATIENT
Start: 2021-10-06 | End: 2021-11-09 | Stop reason: SDUPTHER

## 2021-11-09 DIAGNOSIS — F98.8 ATTENTION DEFICIT DISORDER, UNSPECIFIED HYPERACTIVITY PRESENCE: ICD-10-CM

## 2021-11-10 RX ORDER — DEXTROAMPHETAMINE SULFATE 10 MG/1
10 CAPSULE, EXTENDED RELEASE ORAL 2 TIMES DAILY
Qty: 60 CAPSULE | Refills: 0 | Status: SHIPPED | OUTPATIENT
Start: 2021-11-10 | End: 2021-12-17 | Stop reason: SDUPTHER

## 2021-11-22 ENCOUNTER — CLINICAL SUPPORT (OUTPATIENT)
Dept: INTERNAL MEDICINE | Facility: CLINIC | Age: 41
End: 2021-11-22
Payer: COMMERCIAL

## 2021-11-22 ENCOUNTER — HOSPITAL ENCOUNTER (OUTPATIENT)
Dept: CARDIOLOGY | Facility: HOSPITAL | Age: 41
Discharge: HOME | End: 2021-11-22
Payer: COMMERCIAL

## 2021-11-22 ENCOUNTER — HOSPITAL ENCOUNTER (OUTPATIENT)
Dept: RADIOLOGY | Facility: HOSPITAL | Age: 41
Discharge: HOME | End: 2021-11-22
Attending: FAMILY MEDICINE
Payer: COMMERCIAL

## 2021-11-22 ENCOUNTER — OFFICE VISIT (OUTPATIENT)
Dept: INTERNAL MEDICINE | Facility: CLINIC | Age: 41
End: 2021-11-22
Payer: COMMERCIAL

## 2021-11-22 VITALS
BODY MASS INDEX: 34.44 KG/M2 | SYSTOLIC BLOOD PRESSURE: 119 MMHG | DIASTOLIC BLOOD PRESSURE: 69 MMHG | WEIGHT: 246.94 LBS | TEMPERATURE: 98 F | HEART RATE: 74 BPM

## 2021-11-22 DIAGNOSIS — Z00.00 ROUTINE GENERAL MEDICAL EXAMINATION AT A HEALTH CARE FACILITY: ICD-10-CM

## 2021-11-22 DIAGNOSIS — Z00.00 ROUTINE GENERAL MEDICAL EXAMINATION AT A HEALTH CARE FACILITY: Primary | ICD-10-CM

## 2021-11-22 DIAGNOSIS — E66.9 OBESITY (BMI 30.0-34.9): ICD-10-CM

## 2021-11-22 DIAGNOSIS — Z23 NEED FOR INFLUENZA VACCINATION: ICD-10-CM

## 2021-11-22 DIAGNOSIS — F98.8 ATTENTION DEFICIT DISORDER (ADD) WITHOUT HYPERACTIVITY: ICD-10-CM

## 2021-11-22 LAB
ALBUMIN SERPL BCP-MCNC: 4.3 G/DL (ref 3.5–5.2)
ALP SERPL-CCNC: 43 U/L (ref 55–135)
ALT SERPL W/O P-5'-P-CCNC: 35 U/L (ref 10–44)
ANION GAP SERPL CALC-SCNC: 10 MMOL/L (ref 8–16)
AST SERPL-CCNC: 12 U/L (ref 10–40)
BILIRUB SERPL-MCNC: 1.2 MG/DL (ref 0.1–1)
BUN SERPL-MCNC: 13 MG/DL (ref 6–20)
CALCIUM SERPL-MCNC: 9.4 MG/DL (ref 8.7–10.5)
CHLORIDE SERPL-SCNC: 106 MMOL/L (ref 95–110)
CHOLEST SERPL-MCNC: 157 MG/DL (ref 120–199)
CHOLEST/HDLC SERPL: 4.6 {RATIO} (ref 2–5)
CO2 SERPL-SCNC: 24 MMOL/L (ref 23–29)
CREAT SERPL-MCNC: 0.8 MG/DL (ref 0.5–1.4)
ERYTHROCYTE [DISTWIDTH] IN BLOOD BY AUTOMATED COUNT: 12.7 % (ref 11.5–14.5)
EST. GFR  (AFRICAN AMERICAN): >60 ML/MIN/1.73 M^2
EST. GFR  (NON AFRICAN AMERICAN): >60 ML/MIN/1.73 M^2
ESTIMATED AVG GLUCOSE: 108 MG/DL (ref 68–131)
GLUCOSE SERPL-MCNC: 88 MG/DL (ref 70–110)
HBA1C MFR BLD: 5.4 % (ref 4–5.6)
HCT VFR BLD AUTO: 43.7 % (ref 40–54)
HDLC SERPL-MCNC: 34 MG/DL (ref 40–75)
HDLC SERPL: 21.7 % (ref 20–50)
HGB BLD-MCNC: 14.9 G/DL (ref 14–18)
LDLC SERPL CALC-MCNC: 98.6 MG/DL (ref 63–159)
MCH RBC QN AUTO: 28.2 PG (ref 27–31)
MCHC RBC AUTO-ENTMCNC: 34.1 G/DL (ref 32–36)
MCV RBC AUTO: 83 FL (ref 82–98)
NONHDLC SERPL-MCNC: 123 MG/DL
PLATELET # BLD AUTO: 212 K/UL (ref 150–450)
PMV BLD AUTO: 10.5 FL (ref 9.2–12.9)
POTASSIUM SERPL-SCNC: 4.4 MMOL/L (ref 3.5–5.1)
PROT SERPL-MCNC: 6.8 G/DL (ref 6–8.4)
RBC # BLD AUTO: 5.28 M/UL (ref 4.6–6.2)
SODIUM SERPL-SCNC: 140 MMOL/L (ref 136–145)
TRIGL SERPL-MCNC: 122 MG/DL (ref 30–150)
TSH SERPL DL<=0.005 MIU/L-ACNC: 1.77 UIU/ML (ref 0.4–4)
WBC # BLD AUTO: 5.41 K/UL (ref 3.9–12.7)

## 2021-11-22 PROCEDURE — 93005 ELECTROCARDIOGRAM TRACING: CPT

## 2021-11-22 PROCEDURE — 99395 PREV VISIT EST AGE 18-39: CPT | Mod: 25,S$GLB,, | Performed by: FAMILY MEDICINE

## 2021-11-22 PROCEDURE — 84443 ASSAY THYROID STIM HORMONE: CPT | Performed by: FAMILY MEDICINE

## 2021-11-22 PROCEDURE — 83036 HEMOGLOBIN GLYCOSYLATED A1C: CPT | Performed by: FAMILY MEDICINE

## 2021-11-22 PROCEDURE — 80061 LIPID PANEL: CPT | Performed by: FAMILY MEDICINE

## 2021-11-22 PROCEDURE — 99395 PR PREVENTIVE VISIT,EST,18-39: ICD-10-PCS | Mod: 25,S$GLB,, | Performed by: FAMILY MEDICINE

## 2021-11-22 PROCEDURE — 93010 EKG 12-LEAD: ICD-10-PCS | Mod: ,,, | Performed by: INTERNAL MEDICINE

## 2021-11-22 PROCEDURE — 85027 COMPLETE CBC AUTOMATED: CPT | Performed by: FAMILY MEDICINE

## 2021-11-22 PROCEDURE — 71046 X-RAY EXAM CHEST 2 VIEWS: CPT | Mod: 26,,, | Performed by: RADIOLOGY

## 2021-11-22 PROCEDURE — 99999 PR PBB SHADOW E&M-EST. PATIENT-LVL III: ICD-10-PCS | Mod: PBBFAC,,, | Performed by: FAMILY MEDICINE

## 2021-11-22 PROCEDURE — 86803 HEPATITIS C AB TEST: CPT | Performed by: FAMILY MEDICINE

## 2021-11-22 PROCEDURE — 90471 FLU VACCINE (QUAD) GREATER THAN OR EQUAL TO 3YO PRESERVATIVE FREE IM: ICD-10-PCS | Mod: S$GLB,,, | Performed by: FAMILY MEDICINE

## 2021-11-22 PROCEDURE — 71046 X-RAY EXAM CHEST 2 VIEWS: CPT | Mod: TC

## 2021-11-22 PROCEDURE — 71046 XR CHEST PA AND LATERAL: ICD-10-PCS | Mod: 26,,, | Performed by: RADIOLOGY

## 2021-11-22 PROCEDURE — 99999 PR PBB SHADOW E&M-EST. PATIENT-LVL III: CPT | Mod: PBBFAC,,, | Performed by: FAMILY MEDICINE

## 2021-11-22 PROCEDURE — 90686 FLU VACCINE (QUAD) GREATER THAN OR EQUAL TO 3YO PRESERVATIVE FREE IM: ICD-10-PCS | Mod: S$GLB,,, | Performed by: FAMILY MEDICINE

## 2021-11-22 PROCEDURE — 93010 ELECTROCARDIOGRAM REPORT: CPT | Mod: ,,, | Performed by: INTERNAL MEDICINE

## 2021-11-22 PROCEDURE — 80053 COMPREHEN METABOLIC PANEL: CPT | Performed by: FAMILY MEDICINE

## 2021-11-22 PROCEDURE — 90471 IMMUNIZATION ADMIN: CPT | Mod: S$GLB,,, | Performed by: FAMILY MEDICINE

## 2021-11-22 PROCEDURE — 90686 IIV4 VACC NO PRSV 0.5 ML IM: CPT | Mod: S$GLB,,, | Performed by: FAMILY MEDICINE

## 2021-11-23 LAB — HCV AB SERPL QL IA: NEGATIVE

## 2021-12-17 DIAGNOSIS — F98.8 ATTENTION DEFICIT DISORDER, UNSPECIFIED HYPERACTIVITY PRESENCE: ICD-10-CM

## 2021-12-20 RX ORDER — DEXTROAMPHETAMINE SULFATE 10 MG/1
10 CAPSULE, EXTENDED RELEASE ORAL 2 TIMES DAILY
Qty: 60 CAPSULE | Refills: 0 | Status: SHIPPED | OUTPATIENT
Start: 2021-12-20 | End: 2022-01-18 | Stop reason: SDUPTHER

## 2022-01-18 DIAGNOSIS — F98.8 ATTENTION DEFICIT DISORDER, UNSPECIFIED HYPERACTIVITY PRESENCE: ICD-10-CM

## 2022-01-18 RX ORDER — DEXTROAMPHETAMINE SULFATE 10 MG/1
10 CAPSULE, EXTENDED RELEASE ORAL 2 TIMES DAILY
Qty: 60 CAPSULE | Refills: 0 | Status: SHIPPED | OUTPATIENT
Start: 2022-01-18 | End: 2022-02-24 | Stop reason: SDUPTHER

## 2022-01-18 NOTE — TELEPHONE ENCOUNTER
No new care gaps identified.  Powered by [employer]. Reference number: [id number].   1/18/2022 8:07:48 AM CST

## 2022-02-24 ENCOUNTER — OFFICE VISIT (OUTPATIENT)
Dept: INTERNAL MEDICINE | Facility: CLINIC | Age: 42
End: 2022-02-24
Payer: COMMERCIAL

## 2022-02-24 DIAGNOSIS — F98.8 ATTENTION DEFICIT DISORDER (ADD) WITHOUT HYPERACTIVITY: Primary | ICD-10-CM

## 2022-02-24 PROCEDURE — 99213 PR OFFICE/OUTPT VISIT, EST, LEVL III, 20-29 MIN: ICD-10-PCS | Mod: 95,,, | Performed by: FAMILY MEDICINE

## 2022-02-24 PROCEDURE — 1160F RVW MEDS BY RX/DR IN RCRD: CPT | Mod: CPTII,95,, | Performed by: FAMILY MEDICINE

## 2022-02-24 PROCEDURE — 1160F PR REVIEW ALL MEDS BY PRESCRIBER/CLIN PHARMACIST DOCUMENTED: ICD-10-PCS | Mod: CPTII,95,, | Performed by: FAMILY MEDICINE

## 2022-02-24 PROCEDURE — 1159F PR MEDICATION LIST DOCUMENTED IN MEDICAL RECORD: ICD-10-PCS | Mod: CPTII,95,, | Performed by: FAMILY MEDICINE

## 2022-02-24 PROCEDURE — 1159F MED LIST DOCD IN RCRD: CPT | Mod: CPTII,95,, | Performed by: FAMILY MEDICINE

## 2022-02-24 PROCEDURE — 99213 OFFICE O/P EST LOW 20 MIN: CPT | Mod: 95,,, | Performed by: FAMILY MEDICINE

## 2022-02-24 RX ORDER — DEXTROAMPHETAMINE SULFATE 10 MG/1
10 CAPSULE, EXTENDED RELEASE ORAL 2 TIMES DAILY
Qty: 60 CAPSULE | Refills: 0 | Status: SHIPPED | OUTPATIENT
Start: 2022-02-24 | End: 2022-04-18 | Stop reason: SDUPTHER

## 2022-02-24 NOTE — PROGRESS NOTES
Subjective:   Patient ID: Theron Manrique is a 41 y.o. male.  Chief Complaint:  No chief complaint on file.    The patient location is: Work  The chief complaint leading to consultation is: ADHD Follow Up    Visit type: audiovisual    Face to Face time with patient: 10 minutes  15 minutes of total time spent on the encounter, which includes face to face time and non-face to face time preparing to see the patient (eg, review of tests), Obtaining and/or reviewing separately obtained history, Documenting clinical information in the electronic or other health record, Independently interpreting results (not separately reported) and communicating results to the patient/family/caregiver, or Care coordination (not separately reported).     Each patient to whom he or she provides medical services by telemedicine is:  (1) informed of the relationship between the physician and patient and the respective role of any other health care provider with respect to management of the patient; and (2) notified that he or she may decline to receive medical services by telemedicine and may withdraw from such care at any time.    ADHD follow-up  Last visit November 2021 for executive health evaluation  All labs stable and okay to continue stimulant medication for ADHD  On Dexedrine Spansules 10 mg twice a day  Still effective with symptoms controlled on present medication and dose helping compensate for deficits at work/home.  Duration is appropriate.    No mood instability, tics, or disordered sleep, or other apparent adverse effects.    Tolerating current treatment well.   No behaviors to suggest inappropriate use of prescribed medications.  [place] Board of Pharmacy Controlled Prescription Drug Monitoring database was queried and showed no activity to suggest abuse, diversion, or other inappropriate use of prescription medications.   No additional complaints concerns today.      Current Outpatient Medications:      dextroamphetamine (DEXEDRINE SPANSULE) 10 MG 24 hr capsule, Take 1 capsule (10 mg total) by mouth 2 (two) times a day., Disp: 60 capsule, Rfl: 0    Review of Systems   Constitutional: Negative for activity change, appetite change, fatigue and unexpected weight change.   HENT: Negative for hearing loss, rhinorrhea and trouble swallowing.    Eyes: Negative for discharge and visual disturbance.   Respiratory: Negative for chest tightness, shortness of breath and wheezing.    Cardiovascular: Negative for chest pain, palpitations and leg swelling.   Gastrointestinal: Negative for abdominal pain, blood in stool, constipation, diarrhea, nausea and vomiting.   Endocrine: Negative for polydipsia and polyuria.   Genitourinary: Negative for difficulty urinating, hematuria and urgency.   Musculoskeletal: Negative for arthralgias, joint swelling, myalgias and neck pain.   Skin: Negative for rash.   Neurological: Negative for dizziness, tremors, weakness, light-headedness and headaches.   Psychiatric/Behavioral: Negative for agitation, behavioral problems, confusion, decreased concentration, dysphoric mood, hallucinations and sleep disturbance. The patient is not nervous/anxious and is not hyperactive.      Objective:   There were no vitals taken for this visit.    Physical Exam  Constitutional:       General: He is not in acute distress.     Appearance: Normal appearance. He is well-developed. He is not ill-appearing or toxic-appearing.   Pulmonary:      Effort: Pulmonary effort is normal. No tachypnea, accessory muscle usage or respiratory distress.   Skin:     Findings: No rash.   Neurological:      Mental Status: He is alert and oriented to person, place, and time.   Psychiatric:         Attention and Perception: Attention normal.         Mood and Affect: Mood and affect normal.         Behavior: Behavior normal.         Thought Content: Thought content normal.         Judgment: Judgment normal.       Assessment:        ICD-10-CM ICD-9-CM   1. Attention deficit disorder (ADD) without hyperactivity  F98.8 314.00     Plan:   Attention deficit disorder (ADD) without hyperactivity  -     dextroamphetamine (DEXEDRINE SPANSULE) 10 MG 24 hr capsule; Take 1 capsule (10 mg total) by mouth 2 (two) times a day.  Dispense: 60 capsule; Refill: 0    ADHD, stable, no side effects, controlled on present medication  Continue stimulant at present dose  OK to refill monthly x 2  RTC in 3 months  Telemedicine visit appropriate if patient prefers

## 2022-06-15 ENCOUNTER — OFFICE VISIT (OUTPATIENT)
Dept: URGENT CARE | Facility: CLINIC | Age: 42
End: 2022-06-15
Payer: COMMERCIAL

## 2022-06-15 VITALS
DIASTOLIC BLOOD PRESSURE: 67 MMHG | HEIGHT: 71 IN | WEIGHT: 238 LBS | BODY MASS INDEX: 33.32 KG/M2 | TEMPERATURE: 97 F | HEART RATE: 87 BPM | SYSTOLIC BLOOD PRESSURE: 129 MMHG | RESPIRATION RATE: 18 BRPM | OXYGEN SATURATION: 96 %

## 2022-06-15 DIAGNOSIS — J32.9 SINOBRONCHITIS: ICD-10-CM

## 2022-06-15 DIAGNOSIS — J01.00 ACUTE NON-RECURRENT MAXILLARY SINUSITIS: Primary | ICD-10-CM

## 2022-06-15 DIAGNOSIS — J40 SINOBRONCHITIS: ICD-10-CM

## 2022-06-15 LAB
CTP QC/QA: YES
SARS-COV-2 RDRP RESP QL NAA+PROBE: NEGATIVE

## 2022-06-15 PROCEDURE — 3078F PR MOST RECENT DIASTOLIC BLOOD PRESSURE < 80 MM HG: ICD-10-PCS | Mod: CPTII,S$GLB,, | Performed by: PHYSICIAN ASSISTANT

## 2022-06-15 PROCEDURE — 1160F RVW MEDS BY RX/DR IN RCRD: CPT | Mod: CPTII,S$GLB,, | Performed by: PHYSICIAN ASSISTANT

## 2022-06-15 PROCEDURE — 1159F MED LIST DOCD IN RCRD: CPT | Mod: CPTII,S$GLB,, | Performed by: PHYSICIAN ASSISTANT

## 2022-06-15 PROCEDURE — 3008F PR BODY MASS INDEX (BMI) DOCUMENTED: ICD-10-PCS | Mod: CPTII,S$GLB,, | Performed by: PHYSICIAN ASSISTANT

## 2022-06-15 PROCEDURE — 3078F DIAST BP <80 MM HG: CPT | Mod: CPTII,S$GLB,, | Performed by: PHYSICIAN ASSISTANT

## 2022-06-15 PROCEDURE — U0002 COVID-19 LAB TEST NON-CDC: HCPCS | Mod: QW,S$GLB,, | Performed by: PHYSICIAN ASSISTANT

## 2022-06-15 PROCEDURE — 3074F PR MOST RECENT SYSTOLIC BLOOD PRESSURE < 130 MM HG: ICD-10-PCS | Mod: CPTII,S$GLB,, | Performed by: PHYSICIAN ASSISTANT

## 2022-06-15 PROCEDURE — 99214 PR OFFICE/OUTPT VISIT, EST, LEVL IV, 30-39 MIN: ICD-10-PCS | Mod: S$GLB,,, | Performed by: PHYSICIAN ASSISTANT

## 2022-06-15 PROCEDURE — 1160F PR REVIEW ALL MEDS BY PRESCRIBER/CLIN PHARMACIST DOCUMENTED: ICD-10-PCS | Mod: CPTII,S$GLB,, | Performed by: PHYSICIAN ASSISTANT

## 2022-06-15 PROCEDURE — 99214 OFFICE O/P EST MOD 30 MIN: CPT | Mod: S$GLB,,, | Performed by: PHYSICIAN ASSISTANT

## 2022-06-15 PROCEDURE — 3008F BODY MASS INDEX DOCD: CPT | Mod: CPTII,S$GLB,, | Performed by: PHYSICIAN ASSISTANT

## 2022-06-15 PROCEDURE — U0002: ICD-10-PCS | Mod: QW,S$GLB,, | Performed by: PHYSICIAN ASSISTANT

## 2022-06-15 PROCEDURE — 1159F PR MEDICATION LIST DOCUMENTED IN MEDICAL RECORD: ICD-10-PCS | Mod: CPTII,S$GLB,, | Performed by: PHYSICIAN ASSISTANT

## 2022-06-15 PROCEDURE — 3074F SYST BP LT 130 MM HG: CPT | Mod: CPTII,S$GLB,, | Performed by: PHYSICIAN ASSISTANT

## 2022-06-15 RX ORDER — AMOXICILLIN AND CLAVULANATE POTASSIUM 875; 125 MG/1; MG/1
1 TABLET, FILM COATED ORAL 2 TIMES DAILY
Qty: 20 TABLET | Refills: 0 | Status: SHIPPED | OUTPATIENT
Start: 2022-06-15 | End: 2022-06-25

## 2022-06-15 RX ORDER — FLUTICASONE PROPIONATE 50 MCG
2 SPRAY, SUSPENSION (ML) NASAL DAILY
Qty: 9.9 ML | Refills: 0 | Status: SHIPPED | OUTPATIENT
Start: 2022-06-15 | End: 2022-06-22

## 2022-06-15 RX ORDER — PROMETHAZINE HYDROCHLORIDE AND DEXTROMETHORPHAN HYDROBROMIDE 6.25; 15 MG/5ML; MG/5ML
5 SYRUP ORAL EVERY 4 HOURS PRN
Qty: 180 ML | Refills: 0 | Status: SHIPPED | OUTPATIENT
Start: 2022-06-15 | End: 2022-06-22

## 2022-06-15 NOTE — PROGRESS NOTES
"Subjective:       Patient ID: Theron Manrique is a 42 y.o. male.    Vitals:  height is 5' 11" (1.803 m) and weight is 108 kg (238 lb). His tympanic temperature is 97 °F (36.1 °C). His blood pressure is 129/67 and his pulse is 87. His respiration is 18 and oxygen saturation is 96%.     Chief Complaint: Nasal Congestion (No other symptoms beside nasal drip and sinus congestion. I have bad allergies and may have a sinus infection - Entered by patient)    Patient stated he did an at home covid test and results are negative. Persistent sinus pressure + congestion, cough that wakes him up at night x 1 week. No fever. Symptoms worsened 2 days ago     Sinus Problem  This is a new problem. The current episode started 1 to 4 weeks ago (Wednesday ). The problem is unchanged. There has been no fever. His pain is at a severity of 0/10. He is experiencing no pain. Associated symptoms include congestion, coughing and sinus pressure. Pertinent negatives include no chills, diaphoresis, ear pain, headaches, hoarse voice, neck pain, shortness of breath, sneezing, sore throat or swollen glands. Treatments tried: Mucinex. The treatment provided no relief.       Constitution: Negative for chills and sweating.   HENT: Positive for congestion, sinus pain and sinus pressure. Negative for ear pain and sore throat.    Neck: Negative for neck pain and neck stiffness.   Cardiovascular: Negative for chest pain, leg swelling, palpitations and sob on exertion.   Eyes: Negative for eye itching, eye pain and eye redness.   Respiratory: Positive for cough and sputum production. Negative for shortness of breath.    Gastrointestinal: Negative for abdominal pain, nausea, vomiting and diarrhea.   Genitourinary: Negative for dysuria.   Musculoskeletal: Negative for muscle ache.   Skin: Negative for wound and abrasion.   Allergic/Immunologic: Negative for sneezing.   Neurological: Negative for headaches.       Objective:      Physical Exam "   Constitutional: He is oriented to person, place, and time. He appears well-developed. He is cooperative.  Non-toxic appearance. He does not appear ill. No distress.   HENT:   Head: Normocephalic and atraumatic.   Ears:   Right Ear: Hearing, tympanic membrane, external ear and ear canal normal.   Left Ear: Hearing, tympanic membrane, external ear and ear canal normal.   Nose: Mucosal edema present. No rhinorrhea or nasal deformity. No epistaxis. Right sinus exhibits maxillary sinus tenderness. Right sinus exhibits no frontal sinus tenderness. Left sinus exhibits maxillary sinus tenderness. Left sinus exhibits no frontal sinus tenderness.   Mouth/Throat: Uvula is midline, oropharynx is clear and moist and mucous membranes are normal. No trismus in the jaw. Normal dentition. No uvula swelling. No oropharyngeal exudate, posterior oropharyngeal edema or posterior oropharyngeal erythema.   Eyes: Conjunctivae and lids are normal. No scleral icterus.   Neck: Trachea normal and phonation normal. Neck supple. No edema present. No erythema present. No neck rigidity present.   Cardiovascular: Normal rate, regular rhythm, normal heart sounds and normal pulses.   Pulmonary/Chest: Effort normal and breath sounds normal. No respiratory distress. He has no decreased breath sounds. He has no wheezes. He has no rhonchi.   Abdominal: Normal appearance. There is no abdominal tenderness.   Musculoskeletal: Normal range of motion.         General: No deformity. Normal range of motion.   Neurological: He is alert and oriented to person, place, and time. He exhibits normal muscle tone. Coordination normal.   Skin: Skin is warm, dry, intact, not diaphoretic and not pale.   Psychiatric: His speech is normal and behavior is normal. Judgment and thought content normal.   Nursing note and vitals reviewed.        Results for orders placed or performed in visit on 06/15/22   POCT COVID-19 Rapid Screening   Result Value Ref Range    POC Rapid  COVID Negative Negative     Acceptable Yes        Assessment:       1. Acute non-recurrent maxillary sinusitis    2. Sinobronchitis          Plan:         Acute non-recurrent maxillary sinusitis  -     POCT COVID-19 Rapid Screening  -     amoxicillin-clavulanate 875-125mg (AUGMENTIN) 875-125 mg per tablet; Take 1 tablet by mouth 2 (two) times daily. for 10 days  Dispense: 20 tablet; Refill: 0  -     fluticasone propionate (FLONASE) 50 mcg/actuation nasal spray; 2 sprays (100 mcg total) by Each Nostril route once daily. Dispense 1 bottle for 7 days  Dispense: 9.9 mL; Refill: 0    Sinobronchitis  -     promethazine-dextromethorphan (PROMETHAZINE-DM) 6.25-15 mg/5 mL Syrp; Take 5 mLs by mouth every 4 (four) hours as needed (cough).  Dispense: 180 mL; Refill: 0    Kaia Moore PA-C  [facility]         Patient Instructions   Take mucinex DM for daytime use. The prescribed promethazine DM at night (may cause drowsiness). Alternatively, if your cough becomes more severe you may take promethazine DM cough syrup every 4-6 hours (but do not combine with over the counter cough syrup). Rest and drink plenty of fluids.Tylenol or motrin for fever, sore throat or body aches. Flonase 1 -2 spray/nostril daily will help with nasal congestion.     Complete all antibiotics.     You need re-evaluated for any new persistent fever > 4 days, persistent sinus pain/pressure after completion of antibiotics, progressive chest pain or shortness of breath, or cough > 4 weeks.

## 2022-06-15 NOTE — PATIENT INSTRUCTIONS
Take mucinex DM for daytime use. The prescribed promethazine DM at night (may cause drowsiness). Alternatively, if your cough becomes more severe you may take promethazine DM cough syrup every 4-6 hours (but do not combine with over the counter cough syrup). Rest and drink plenty of fluids.Tylenol or motrin for fever, sore throat or body aches. Flonase 1 -2 spray/nostril daily will help with nasal congestion.     Complete all antibiotics.     You need re-evaluated for any new persistent fever > 4 days, persistent sinus pain/pressure after completion of antibiotics, progressive chest pain or shortness of breath, or cough > 4 weeks.

## 2022-06-16 ENCOUNTER — TELEPHONE (OUTPATIENT)
Dept: URGENT CARE | Facility: CLINIC | Age: 42
End: 2022-06-16
Payer: COMMERCIAL

## 2022-06-16 NOTE — TELEPHONE ENCOUNTER
Courtesy call to check on patient. No answer. Left VM to return call if any questions or concerns. Kaia Moore

## 2022-06-18 ENCOUNTER — TELEPHONE (OUTPATIENT)
Dept: URGENT CARE | Facility: CLINIC | Age: 42
End: 2022-06-18
Payer: COMMERCIAL

## 2022-06-23 ENCOUNTER — OFFICE VISIT (OUTPATIENT)
Dept: INTERNAL MEDICINE | Facility: CLINIC | Age: 42
End: 2022-06-23
Payer: COMMERCIAL

## 2022-06-23 VITALS
SYSTOLIC BLOOD PRESSURE: 130 MMHG | DIASTOLIC BLOOD PRESSURE: 78 MMHG | BODY MASS INDEX: 34.05 KG/M2 | OXYGEN SATURATION: 98 % | HEIGHT: 71 IN | WEIGHT: 243.19 LBS | HEART RATE: 111 BPM | TEMPERATURE: 97 F

## 2022-06-23 DIAGNOSIS — F98.8 ATTENTION DEFICIT DISORDER (ADD) WITHOUT HYPERACTIVITY: Primary | ICD-10-CM

## 2022-06-23 DIAGNOSIS — Z30.09 VASECTOMY EVALUATION: ICD-10-CM

## 2022-06-23 PROCEDURE — 3008F PR BODY MASS INDEX (BMI) DOCUMENTED: ICD-10-PCS | Mod: CPTII,S$GLB,, | Performed by: FAMILY MEDICINE

## 2022-06-23 PROCEDURE — 1160F RVW MEDS BY RX/DR IN RCRD: CPT | Mod: CPTII,S$GLB,, | Performed by: FAMILY MEDICINE

## 2022-06-23 PROCEDURE — 3078F DIAST BP <80 MM HG: CPT | Mod: CPTII,S$GLB,, | Performed by: FAMILY MEDICINE

## 2022-06-23 PROCEDURE — 1159F MED LIST DOCD IN RCRD: CPT | Mod: CPTII,S$GLB,, | Performed by: FAMILY MEDICINE

## 2022-06-23 PROCEDURE — 99213 PR OFFICE/OUTPT VISIT, EST, LEVL III, 20-29 MIN: ICD-10-PCS | Mod: S$GLB,,, | Performed by: FAMILY MEDICINE

## 2022-06-23 PROCEDURE — 99213 OFFICE O/P EST LOW 20 MIN: CPT | Mod: S$GLB,,, | Performed by: FAMILY MEDICINE

## 2022-06-23 PROCEDURE — 3075F SYST BP GE 130 - 139MM HG: CPT | Mod: CPTII,S$GLB,, | Performed by: FAMILY MEDICINE

## 2022-06-23 PROCEDURE — 99999 PR PBB SHADOW E&M-EST. PATIENT-LVL IV: ICD-10-PCS | Mod: PBBFAC,,, | Performed by: FAMILY MEDICINE

## 2022-06-23 PROCEDURE — 99999 PR PBB SHADOW E&M-EST. PATIENT-LVL IV: CPT | Mod: PBBFAC,,, | Performed by: FAMILY MEDICINE

## 2022-06-23 PROCEDURE — 3078F PR MOST RECENT DIASTOLIC BLOOD PRESSURE < 80 MM HG: ICD-10-PCS | Mod: CPTII,S$GLB,, | Performed by: FAMILY MEDICINE

## 2022-06-23 PROCEDURE — 3075F PR MOST RECENT SYSTOLIC BLOOD PRESS GE 130-139MM HG: ICD-10-PCS | Mod: CPTII,S$GLB,, | Performed by: FAMILY MEDICINE

## 2022-06-23 PROCEDURE — 1159F PR MEDICATION LIST DOCUMENTED IN MEDICAL RECORD: ICD-10-PCS | Mod: CPTII,S$GLB,, | Performed by: FAMILY MEDICINE

## 2022-06-23 PROCEDURE — 1160F PR REVIEW ALL MEDS BY PRESCRIBER/CLIN PHARMACIST DOCUMENTED: ICD-10-PCS | Mod: CPTII,S$GLB,, | Performed by: FAMILY MEDICINE

## 2022-06-23 PROCEDURE — 3008F BODY MASS INDEX DOCD: CPT | Mod: CPTII,S$GLB,, | Performed by: FAMILY MEDICINE

## 2022-06-23 RX ORDER — DEXTROAMPHETAMINE SULFATE 10 MG/1
10 CAPSULE, EXTENDED RELEASE ORAL 2 TIMES DAILY
Qty: 60 CAPSULE | Refills: 0 | Status: SHIPPED | OUTPATIENT
Start: 2022-06-23 | End: 2022-07-18 | Stop reason: SDUPTHER

## 2022-06-23 NOTE — PROGRESS NOTES
Subjective:   Patient ID: Theron Manrique is a 42 y.o. male.  Chief Complaint:  Follow-up    ADHD follow-up  Last visit telemedicine February 2022 for follow-up on ADHD  Last annual physical exam through [facility] November 2021  On Dexedrine Spansules 10 mg twice a day  Effective with symptoms controlled on present medication and dose helping compensate for deficits at work/school.  Duration is appropriate.    No mood instability, tics, or disordered sleep, or other apparent adverse effects.    Tolerating current treatment well.   No behaviors to suggest inappropriate use of prescribed medications.  [place] Board of Pharmacy Controlled Prescription Drug Monitoring database was queried and showed no activity to suggest abuse, diversion, or other inappropriate use of prescription medications.     Also would like referral to Urology to discuss possible vasectomy    Recovering from a sinus infection.  Treated at Urgent Care.  Prescribed Augmentin and Phenergan DM    No additional complaints concerns today.      Current Outpatient Medications:     amoxicillin-clavulanate 875-125mg (AUGMENTIN) 875-125 mg per tablet, Take 1 tablet by mouth 2 (two) times daily. for 10 days, Disp: 20 tablet, Rfl: 0    dextroamphetamine sulfate (DEXEDRINE SPANSULE) 10 MG 24 hr capsule, Take 1 capsule (10 mg total) by mouth 2 (two) times a day., Disp: 60 capsule, Rfl: 0    Review of Systems   Constitutional: Negative for appetite change, fatigue and unexpected weight change.   Eyes: Negative for visual disturbance.   Respiratory: Negative for chest tightness and shortness of breath.    Cardiovascular: Negative for chest pain, palpitations and leg swelling.   Gastrointestinal: Negative for abdominal pain, constipation, diarrhea, nausea and vomiting.   Musculoskeletal: Negative for myalgias.   Skin: Negative for rash.   Neurological: Negative for dizziness, tremors, light-headedness and headaches.   Psychiatric/Behavioral:  "Negative for agitation, behavioral problems, confusion, decreased concentration, dysphoric mood, hallucinations and sleep disturbance. The patient is not nervous/anxious and is not hyperactive.      Objective:   /78 (BP Location: Right arm, Patient Position: Sitting, BP Method: Large (Manual))   Pulse (!) 111   Temp 96.8 °F (36 °C) (Tympanic)   Ht 5' 11" (1.803 m)   Wt 110.3 kg (243 lb 2.7 oz)   SpO2 98%   BMI 33.91 kg/m²     Physical Exam  Vitals and nursing note reviewed.   Constitutional:       General: He is not in acute distress.     Appearance: He is well-developed. He is obese.   Eyes:      General: No scleral icterus.     Conjunctiva/sclera: Conjunctivae normal.      Right eye: Right conjunctiva is not injected.      Left eye: Left conjunctiva is not injected.   Neck:      Thyroid: No thyroid mass, thyromegaly or thyroid tenderness.   Cardiovascular:      Rate and Rhythm: Regular rhythm. Tachycardia present.      Pulses:           Radial pulses are 2+ on the right side.      Heart sounds: Normal heart sounds. No murmur heard.    No friction rub. No gallop.   Pulmonary:      Effort: Pulmonary effort is normal.      Breath sounds: Normal breath sounds and air entry. No wheezing, rhonchi or rales.   Abdominal:      General: There is no distension.      Palpations: Abdomen is soft.      Tenderness: There is no abdominal tenderness. There is no guarding or rebound.   Musculoskeletal:      Right lower leg: No edema.      Left lower leg: No edema.   Skin:     General: Skin is warm and dry.      Findings: No rash.   Neurological:      Mental Status: He is alert and oriented to person, place, and time.      Coordination: Coordination normal.      Gait: Gait normal.   Psychiatric:         Attention and Perception: Attention and perception normal. He is attentive.         Mood and Affect: Mood normal. Mood is not anxious.         Speech: Speech normal. Speech is not rapid and pressured or tangential.        "  Behavior: Behavior normal. Behavior is cooperative.         Thought Content: Thought content normal.         Cognition and Memory: Cognition normal.         Judgment: Judgment normal.       Assessment:       ICD-10-CM ICD-9-CM   1. Attention deficit disorder (ADD) without hyperactivity  F98.8 314.00   2. Vasectomy evaluation  Z30.09 V25.09     Plan:   Attention deficit disorder (ADD) without hyperactivity  -     dextroamphetamine sulfate (DEXEDRINE SPANSULE) 10 MG 24 hr capsule; Take 1 capsule (10 mg total) by mouth 2 (two) times a day.  Dispense: 60 capsule; Refill: 0  ADHD, stable, no side effects, controlled on present medication  Continue stimulant at present dose  OK to refill monthly x 2    Vasectomy evaluation  -     Ambulatory referral/consult to Urology; Future; Expected date: 06/30/2022     RTC in 3 months or sooner as needed

## 2022-08-19 ENCOUNTER — OFFICE VISIT (OUTPATIENT)
Dept: UROLOGY | Facility: CLINIC | Age: 42
End: 2022-08-19
Payer: COMMERCIAL

## 2022-08-19 VITALS
HEIGHT: 71 IN | WEIGHT: 242.31 LBS | SYSTOLIC BLOOD PRESSURE: 125 MMHG | RESPIRATION RATE: 18 BRPM | BODY MASS INDEX: 33.92 KG/M2 | HEART RATE: 77 BPM | DIASTOLIC BLOOD PRESSURE: 80 MMHG

## 2022-08-19 DIAGNOSIS — Z30.09 VASECTOMY EVALUATION: Primary | ICD-10-CM

## 2022-08-19 DIAGNOSIS — Q55.4 CONGENITAL ABSENCE OF VAS DEFERENS: ICD-10-CM

## 2022-08-19 PROCEDURE — 99999 PR PBB SHADOW E&M-EST. PATIENT-LVL IV: CPT | Mod: PBBFAC,,, | Performed by: UROLOGY

## 2022-08-19 PROCEDURE — 1160F RVW MEDS BY RX/DR IN RCRD: CPT | Mod: CPTII,S$GLB,, | Performed by: UROLOGY

## 2022-08-19 PROCEDURE — 3008F BODY MASS INDEX DOCD: CPT | Mod: CPTII,S$GLB,, | Performed by: UROLOGY

## 2022-08-19 PROCEDURE — 3079F DIAST BP 80-89 MM HG: CPT | Mod: CPTII,S$GLB,, | Performed by: UROLOGY

## 2022-08-19 PROCEDURE — 3074F SYST BP LT 130 MM HG: CPT | Mod: CPTII,S$GLB,, | Performed by: UROLOGY

## 2022-08-19 PROCEDURE — 1160F PR REVIEW ALL MEDS BY PRESCRIBER/CLIN PHARMACIST DOCUMENTED: ICD-10-PCS | Mod: CPTII,S$GLB,, | Performed by: UROLOGY

## 2022-08-19 PROCEDURE — 1159F MED LIST DOCD IN RCRD: CPT | Mod: CPTII,S$GLB,, | Performed by: UROLOGY

## 2022-08-19 PROCEDURE — 99203 PR OFFICE/OUTPT VISIT, NEW, LEVL III, 30-44 MIN: ICD-10-PCS | Mod: S$GLB,,, | Performed by: UROLOGY

## 2022-08-19 PROCEDURE — 99203 OFFICE O/P NEW LOW 30 MIN: CPT | Mod: S$GLB,,, | Performed by: UROLOGY

## 2022-08-19 PROCEDURE — 99999 PR PBB SHADOW E&M-EST. PATIENT-LVL IV: ICD-10-PCS | Mod: PBBFAC,,, | Performed by: UROLOGY

## 2022-08-19 PROCEDURE — 3008F PR BODY MASS INDEX (BMI) DOCUMENTED: ICD-10-PCS | Mod: CPTII,S$GLB,, | Performed by: UROLOGY

## 2022-08-19 PROCEDURE — 1159F PR MEDICATION LIST DOCUMENTED IN MEDICAL RECORD: ICD-10-PCS | Mod: CPTII,S$GLB,, | Performed by: UROLOGY

## 2022-08-19 PROCEDURE — 3079F PR MOST RECENT DIASTOLIC BLOOD PRESSURE 80-89 MM HG: ICD-10-PCS | Mod: CPTII,S$GLB,, | Performed by: UROLOGY

## 2022-08-19 PROCEDURE — 3074F PR MOST RECENT SYSTOLIC BLOOD PRESSURE < 130 MM HG: ICD-10-PCS | Mod: CPTII,S$GLB,, | Performed by: UROLOGY

## 2022-08-21 RX ORDER — DIAZEPAM 5 MG/1
5 TABLET ORAL ONCE
Qty: 1 TABLET | Refills: 0 | Status: SHIPPED | OUTPATIENT
Start: 2022-08-21 | End: 2023-04-27

## 2022-08-21 NOTE — PROGRESS NOTES
Chief Complaint:  Vasectomy evaluation    HPI:   Theron Manrique is a 42 y.o. male that presents today as a referral from Dr. Joe for evaluation for vasectomy.  Patient and his wife have two children, aged three and half and eight months and they are certain that they want no further children.  He denies any voiding issues or testicular discomfort, denies ED, denies family history of  cancers.    PMH:  Past Medical History:   Diagnosis Date    ADHD (attention deficit hyperactivity disorder)        PSH:  Past Surgical History:   Procedure Laterality Date    MOLE REMOVAL      TONSILLECTOMY      WISDOM TOOTH EXTRACTION  11/2020       Family History:  Family History   Problem Relation Age of Onset    No Known Problems Mother     Hearing loss Father     Psoriasis Brother     Psoriasis Brother     Psoriasis Paternal Grandmother     Psoriasis Paternal Grandfather     Colon cancer Neg Hx     Prostate cancer Neg Hx        Social History:  Social History     Tobacco Use    Smoking status: Never Smoker    Smokeless tobacco: Never Used   Substance Use Topics    Alcohol use: Yes     Comment: rarely    Drug use: Never        Review of Systems:  General: No fever, chills  Skin: No rashes  Chest:  Denies cough and sputum production  Heart: Denies chest pain  Resp: Denies dyspnea  Abdomen: Denies diarrhea, abdominal pain, hematemesis, or blood in stool.  Musculoskeletal: No joint stiffness or swelling. Denies back pain.  : see HPI  Neuro: no dizziness or weakness    Allergies:  Patient has no known allergies.    Medications:    Current Outpatient Medications:     dextroamphetamine sulfate (DEXEDRINE SPANSULE) 10 MG 24 hr capsule, Take 1 capsule (10 mg total) by mouth 2 (two) times a day., Disp: 60 capsule, Rfl: 0    Physical Exam:  Vitals:    08/19/22 0808   BP: 125/80   Pulse: 77   Resp: 18     Body mass index is 33.79 kg/m².  General: awake, alert, cooperative  Head: NC/AT  Ears: external ears normal  Eyes:  sclera normal  Lungs: normal inspiration, NAD  Heart: well-perfused  Abdomen: Soft, NT, ND  : Normal phallus, meatus normal in size and position, BL testicles palpable, no masses, nontender, right vas easily palpable, left vas and epididymis absent, scrotum accommodating  Lymphatic: groin nodes negative  Skin: The skin is warm and dry  Ext: No c/c/e.  Neuro: grossly intact, AOx3    RADIOLOGY:  No recent relevant imaging available for review.    LABS:  I personally reviewed the following lab values:  Lab Results   Component Value Date    WBC 5.41 11/22/2021    HGB 14.9 11/22/2021    HCT 43.7 11/22/2021     11/22/2021     11/22/2021    K 4.4 11/22/2021     11/22/2021    CREATININE 0.8 11/22/2021    BUN 13 11/22/2021    CO2 24 11/22/2021    TSH 1.769 11/22/2021    HGBA1C 5.4 11/22/2021    CHOL 157 11/22/2021    TRIG 122 11/22/2021    HDL 34 (L) 11/22/2021    ALT 35 11/22/2021    AST 12 11/22/2021       Assessment/Plan:   Theron Manrique is a 42 y.o. male with:    Undesired fertility - Reviewed risks and benefits of vasectomy.  For risks, I included bleeding, infection, pain, failure of the treatment (persistent fertility), as well as the risk of chronic orchialgia. Alternatives include utilizing another form of birth control (condoms, female contraceptives). Reviewed the procedure in detail. Reviewed normal post-procedure course and that he will need to wear a jock strap after the procedure. Also noted that patient is still fertile after the procedure, and will need to continue to use another form of birth control until he has brought us a negative semen analysis. Patient would like to proceed. We will have him scheduled for 9/16.     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     Catarino Kerns MD  Urology

## 2022-08-26 ENCOUNTER — HOSPITAL ENCOUNTER (OUTPATIENT)
Dept: RADIOLOGY | Facility: HOSPITAL | Age: 42
Discharge: HOME | End: 2022-08-26
Attending: UROLOGY
Payer: COMMERCIAL

## 2022-08-26 DIAGNOSIS — Q55.4 CONGENITAL ABSENCE OF VAS DEFERENS: ICD-10-CM

## 2022-08-26 PROCEDURE — 76770 US EXAM ABDO BACK WALL COMP: CPT | Mod: TC

## 2022-08-26 PROCEDURE — 76770 US EXAM ABDO BACK WALL COMP: CPT | Mod: 26,,, | Performed by: RADIOLOGY

## 2022-08-26 PROCEDURE — 76770 US RETROPERITONEAL COMPLETE: ICD-10-PCS | Mod: 26,,, | Performed by: RADIOLOGY

## 2022-09-16 ENCOUNTER — PROCEDURE VISIT (OUTPATIENT)
Dept: UROLOGY | Facility: CLINIC | Age: 42
End: 2022-09-16
Payer: COMMERCIAL

## 2022-09-16 DIAGNOSIS — Z30.2 ENCOUNTER FOR VASECTOMY: Primary | ICD-10-CM

## 2022-09-16 PROCEDURE — 55250 PR REMOVAL OF SPERM DUCT(S): ICD-10-PCS | Mod: S$GLB,,, | Performed by: UROLOGY

## 2022-09-16 PROCEDURE — 88302 TISSUE EXAM BY PATHOLOGIST: CPT | Mod: 59 | Performed by: PATHOLOGY

## 2022-09-16 PROCEDURE — 88302 PR  SURG PATH,LEVEL II: ICD-10-PCS | Mod: 26,,, | Performed by: PATHOLOGY

## 2022-09-16 PROCEDURE — 55250 REMOVAL OF SPERM DUCT(S): CPT | Mod: S$GLB,,, | Performed by: UROLOGY

## 2022-09-16 PROCEDURE — 88302 TISSUE EXAM BY PATHOLOGIST: CPT | Mod: 26,,, | Performed by: PATHOLOGY

## 2022-09-22 LAB
FINAL PATHOLOGIC DIAGNOSIS: NORMAL
GROSS: NORMAL
Lab: NORMAL

## 2022-10-12 DIAGNOSIS — Z00.00 ROUTINE GENERAL MEDICAL EXAMINATION AT A HEALTH CARE FACILITY: Primary | ICD-10-CM

## 2022-10-27 ENCOUNTER — CLINICAL SUPPORT (OUTPATIENT)
Dept: INTERNAL MEDICINE | Facility: CLINIC | Age: 42
End: 2022-10-27
Payer: COMMERCIAL

## 2022-10-27 ENCOUNTER — OFFICE VISIT (OUTPATIENT)
Dept: INTERNAL MEDICINE | Facility: CLINIC | Age: 42
End: 2022-10-27
Payer: COMMERCIAL

## 2022-10-27 ENCOUNTER — TELEPHONE (OUTPATIENT)
Dept: CARDIOLOGY | Facility: HOSPITAL | Age: 42
End: 2022-10-27
Payer: COMMERCIAL

## 2022-10-27 ENCOUNTER — HOSPITAL ENCOUNTER (OUTPATIENT)
Dept: CARDIOLOGY | Facility: HOSPITAL | Age: 42
Discharge: HOME | End: 2022-10-27
Attending: INTERNAL MEDICINE
Payer: COMMERCIAL

## 2022-10-27 VITALS
HEART RATE: 71 BPM | BODY MASS INDEX: 32.55 KG/M2 | WEIGHT: 240.31 LBS | DIASTOLIC BLOOD PRESSURE: 65 MMHG | TEMPERATURE: 97 F | HEIGHT: 72 IN | SYSTOLIC BLOOD PRESSURE: 116 MMHG

## 2022-10-27 DIAGNOSIS — Z00.00 ROUTINE GENERAL MEDICAL EXAMINATION AT A HEALTH CARE FACILITY: Primary | ICD-10-CM

## 2022-10-27 DIAGNOSIS — L98.9 SKIN LESION: ICD-10-CM

## 2022-10-27 DIAGNOSIS — F98.8 ATTENTION DEFICIT DISORDER (ADD) WITHOUT HYPERACTIVITY: ICD-10-CM

## 2022-10-27 DIAGNOSIS — Z00.00 ROUTINE GENERAL MEDICAL EXAMINATION AT A HEALTH CARE FACILITY: ICD-10-CM

## 2022-10-27 LAB
ALBUMIN SERPL BCP-MCNC: 4.3 G/DL (ref 3.5–5.2)
ALP SERPL-CCNC: 45 U/L (ref 55–135)
ALT SERPL W/O P-5'-P-CCNC: 32 U/L (ref 10–44)
ANION GAP SERPL CALC-SCNC: 8 MMOL/L (ref 8–16)
AST SERPL-CCNC: 10 U/L (ref 10–40)
BILIRUB SERPL-MCNC: 1 MG/DL (ref 0.1–1)
BUN SERPL-MCNC: 14 MG/DL (ref 6–20)
CALCIUM SERPL-MCNC: 9.6 MG/DL (ref 8.7–10.5)
CHLORIDE SERPL-SCNC: 107 MMOL/L (ref 95–110)
CHOLEST SERPL-MCNC: 156 MG/DL (ref 120–199)
CHOLEST/HDLC SERPL: 4.1 {RATIO} (ref 2–5)
CO2 SERPL-SCNC: 24 MMOL/L (ref 23–29)
COMPLEXED PSA SERPL-MCNC: 0.56 NG/ML (ref 0–4)
CREAT SERPL-MCNC: 0.9 MG/DL (ref 0.5–1.4)
ERYTHROCYTE [DISTWIDTH] IN BLOOD BY AUTOMATED COUNT: 12.6 % (ref 11.5–14.5)
EST. GFR  (NO RACE VARIABLE): >60 ML/MIN/1.73 M^2
ESTIMATED AVG GLUCOSE: 105 MG/DL (ref 68–131)
GLUCOSE SERPL-MCNC: 90 MG/DL (ref 70–110)
HBA1C MFR BLD: 5.3 % (ref 4–5.6)
HCT VFR BLD AUTO: 43.5 % (ref 40–54)
HDLC SERPL-MCNC: 38 MG/DL (ref 40–75)
HDLC SERPL: 24.4 % (ref 20–50)
HGB BLD-MCNC: 15 G/DL (ref 14–18)
LDLC SERPL CALC-MCNC: 97.2 MG/DL (ref 63–159)
MCH RBC QN AUTO: 28.8 PG (ref 27–31)
MCHC RBC AUTO-ENTMCNC: 34.5 G/DL (ref 32–36)
MCV RBC AUTO: 84 FL (ref 82–98)
NONHDLC SERPL-MCNC: 118 MG/DL
PLATELET # BLD AUTO: 221 K/UL (ref 150–450)
PMV BLD AUTO: 9.8 FL (ref 9.2–12.9)
POTASSIUM SERPL-SCNC: 4.5 MMOL/L (ref 3.5–5.1)
PROT SERPL-MCNC: 6.9 G/DL (ref 6–8.4)
RBC # BLD AUTO: 5.21 M/UL (ref 4.6–6.2)
SODIUM SERPL-SCNC: 139 MMOL/L (ref 136–145)
TRIGL SERPL-MCNC: 104 MG/DL (ref 30–150)
TSH SERPL DL<=0.005 MIU/L-ACNC: 1.27 UIU/ML (ref 0.4–4)
WBC # BLD AUTO: 5.17 K/UL (ref 3.9–12.7)

## 2022-10-27 PROCEDURE — 84153 ASSAY OF PSA TOTAL: CPT | Performed by: INTERNAL MEDICINE

## 2022-10-27 PROCEDURE — 93010 EKG 12-LEAD: ICD-10-PCS | Mod: ,,, | Performed by: INTERNAL MEDICINE

## 2022-10-27 PROCEDURE — 3044F PR MOST RECENT HEMOGLOBIN A1C LEVEL <7.0%: ICD-10-PCS | Mod: CPTII,S$GLB,, | Performed by: INTERNAL MEDICINE

## 2022-10-27 PROCEDURE — 3008F PR BODY MASS INDEX (BMI) DOCUMENTED: ICD-10-PCS | Mod: CPTII,S$GLB,, | Performed by: INTERNAL MEDICINE

## 2022-10-27 PROCEDURE — 97802 MEDICAL NUTRITION INDIV IN: CPT | Mod: S$GLB,,, | Performed by: INTERNAL MEDICINE

## 2022-10-27 PROCEDURE — 83036 HEMOGLOBIN GLYCOSYLATED A1C: CPT | Performed by: INTERNAL MEDICINE

## 2022-10-27 PROCEDURE — 99999 PR PBB SHADOW E&M-EST. PATIENT-LVL IV: ICD-10-PCS | Mod: PBBFAC,,, | Performed by: INTERNAL MEDICINE

## 2022-10-27 PROCEDURE — 99396 PR PREVENTIVE VISIT,EST,40-64: ICD-10-PCS | Mod: S$GLB,,, | Performed by: INTERNAL MEDICINE

## 2022-10-27 PROCEDURE — 3044F HG A1C LEVEL LT 7.0%: CPT | Mod: CPTII,S$GLB,, | Performed by: INTERNAL MEDICINE

## 2022-10-27 PROCEDURE — 99396 PREV VISIT EST AGE 40-64: CPT | Mod: S$GLB,,, | Performed by: INTERNAL MEDICINE

## 2022-10-27 PROCEDURE — 99999 PR PBB SHADOW E&M-EST. PATIENT-LVL IV: CPT | Mod: PBBFAC,,, | Performed by: INTERNAL MEDICINE

## 2022-10-27 PROCEDURE — 3074F SYST BP LT 130 MM HG: CPT | Mod: CPTII,S$GLB,, | Performed by: INTERNAL MEDICINE

## 2022-10-27 PROCEDURE — 3074F PR MOST RECENT SYSTOLIC BLOOD PRESSURE < 130 MM HG: ICD-10-PCS | Mod: CPTII,S$GLB,, | Performed by: INTERNAL MEDICINE

## 2022-10-27 PROCEDURE — 97802 PR MED NUTR THER, 1ST, INDIV, EA 15 MIN: ICD-10-PCS | Mod: S$GLB,,, | Performed by: INTERNAL MEDICINE

## 2022-10-27 PROCEDURE — 93005 ELECTROCARDIOGRAM TRACING: CPT

## 2022-10-27 PROCEDURE — 80061 LIPID PANEL: CPT | Performed by: INTERNAL MEDICINE

## 2022-10-27 PROCEDURE — 85027 COMPLETE CBC AUTOMATED: CPT | Performed by: INTERNAL MEDICINE

## 2022-10-27 PROCEDURE — 84443 ASSAY THYROID STIM HORMONE: CPT | Performed by: INTERNAL MEDICINE

## 2022-10-27 PROCEDURE — 93010 ELECTROCARDIOGRAM REPORT: CPT | Mod: ,,, | Performed by: INTERNAL MEDICINE

## 2022-10-27 PROCEDURE — 3078F PR MOST RECENT DIASTOLIC BLOOD PRESSURE < 80 MM HG: ICD-10-PCS | Mod: CPTII,S$GLB,, | Performed by: INTERNAL MEDICINE

## 2022-10-27 PROCEDURE — 80053 COMPREHEN METABOLIC PANEL: CPT | Performed by: INTERNAL MEDICINE

## 2022-10-27 PROCEDURE — 3078F DIAST BP <80 MM HG: CPT | Mod: CPTII,S$GLB,, | Performed by: INTERNAL MEDICINE

## 2022-10-27 PROCEDURE — 3008F BODY MASS INDEX DOCD: CPT | Mod: CPTII,S$GLB,, | Performed by: INTERNAL MEDICINE

## 2022-10-27 RX ORDER — DEXTROAMPHETAMINE SULFATE 10 MG/1
10 CAPSULE, EXTENDED RELEASE ORAL 2 TIMES DAILY
Qty: 60 CAPSULE | Refills: 0 | Status: SHIPPED | OUTPATIENT
Start: 2022-10-27 | End: 2022-11-21 | Stop reason: SDUPTHER

## 2022-10-27 NOTE — PROGRESS NOTES
Subjective:      Patient ID: Theron Manrique is a 42 y.o. male.    Chief Complaint: Executive Health    HPI    On 10/27/22, it was a pleasure to see you  and perform your Executive Health evaluation. At the time of this visit, you are 42 years old.      YOUR PAST MEDICAL HISTORY includes ADHD.     YOUR PAST SURGICAL HISTORY includes a tonsillectomy, wisdom teeth extraction, vasectomy, and excision of a benign skin lesion.     YOUR CURRENT MEDICATIONS include Dexedrine Spansules 10 mg twice a day.     You do not have ANY KNOWN DRUG ALLERGIES.     YOUR SOCIAL HISTORY includes employment by [employer].  You are  with one child.   You drink alcohol infrequently.  You denied any tobacco or illicit drug use.       YOUR FAMILY HISTORY includes your father, alive, with hearing loss and prostate cancer diagnosed in his 70s. Your mother, alive, with no known medical problems. Multiple family members have psoriasis. Your paternal grandfather possibly had colon and/or prostate cancer.     YOUR ROUTINE HEALTH MAINTENANCE includes a tetanus booster November 2018, declined flu vaccine. Consider new covid vaccine.          Review of Systems   Constitutional:  Negative for chills and fever.   HENT:  Negative for ear pain and sore throat.    Respiratory:  Negative for cough.    Cardiovascular:  Negative for chest pain.   Gastrointestinal:  Negative for abdominal pain and blood in stool.   Genitourinary:  Negative for dysuria and hematuria.   Neurological:  Negative for seizures and syncope.   Objective:   /65   Pulse 71   Temp 96.6 °F (35.9 °C) (Tympanic)   Ht 6' (1.829 m)   Wt 109 kg (240 lb 4.8 oz)   BMI 32.59 kg/m²     Physical Exam  Constitutional:       General: He is not in acute distress.     Appearance: He is well-developed.   HENT:      Head: Normocephalic and atraumatic.   Eyes:      Extraocular Movements: Extraocular movements intact.   Neck:      Thyroid: No thyromegaly.   Cardiovascular:      Rate and  Rhythm: Normal rate and regular rhythm.   Pulmonary:      Breath sounds: Normal breath sounds. No wheezing or rales.   Abdominal:      General: Bowel sounds are normal.      Palpations: Abdomen is soft.      Tenderness: There is no abdominal tenderness.   Musculoskeletal:         General: No swelling.      Cervical back: Neck supple. No rigidity.   Lymphadenopathy:      Cervical: No cervical adenopathy.   Skin:     General: Skin is warm and dry.   Neurological:      Mental Status: He is alert and oriented to person, place, and time.   Psychiatric:         Behavior: Behavior normal.       The 10-year ASCVD risk score (Dora SOLANO, et al., 2019) is: 0.8%    Values used to calculate the score:      Age: 42 years      Sex: Male      Is Non- : No      Diabetic: No      Tobacco smoker: No      Systolic Blood Pressure: 116 mmHg      Is BP treated: No      HDL Cholesterol: 38 mg/dL      Total Cholesterol: 156 mg/dL    Assessment:     1. Routine general medical examination at a health care facility    2. Attention deficit disorder (ADD) without hyperactivity    3. Skin lesion      Plan:   1. Routine general medical examination at a health care facility    2. Attention deficit disorder (ADD) without hyperactivity  -     Discontinue: dextroamphetamine sulfate (DEXEDRINE SPANSULE) 10 MG 24 hr capsule; Take 1 capsule (10 mg total) by mouth 2 (two) times a day.  Dispense: 60 capsule; Refill: 0    3. Skin lesion  -     Ambulatory referral/consult to Dermatology; Future; Expected date: 11/03/2022    Heart healthy diet, regular exercise, and regular use of sunscreen.   HM reviewed  See exec health letter for details.   Lab Frequency Next Occurrence       Follow up in about 1 year (around 10/27/2023), or if symptoms worsen or fail to improve.

## 2022-10-27 NOTE — PROGRESS NOTES
"Nutrition Assessment  Session Time:  45 minutes      Client name:  Theron Manrique  :  1980  Age:  42 y.o.  Gender:  male    Client states:  [employer] employee present for nutrition counseling as part of his Executive Health physical. Last physical was in 2021. No prior nutrition consultations completed.  Reports having rough eating habits due to having 2 small children (under 1 year, almost 4 years old) and finding self eating their leftovers.   Reports limited dining out and fried food intake.   Consumes a select few vegetables and fruits are consumed on an occasional basis.  Meats: pork, fish, chicken, red meats  Drinks: water, gatorade (lower sugar option), alcohol (occasionally, some weekends)    Exercise: 4-5 days weekly, mainly weight training. Does not include cardio due to amount of climbing involved at work so patient counts that towards his cardio activity.       Anthropometrics  Height:  72"     Weight:  240 lbs  BMI:  32.59  % Body Fat:  n/a    Clinical Signs/Symptoms  N/V/D:  none reported  Appetite:  good       Past Medical History:   Diagnosis Date    ADHD (attention deficit hyperactivity disorder)        Past Surgical History:   Procedure Laterality Date    MOLE REMOVAL      TONSILLECTOMY      WISDOM TOOTH EXTRACTION  2020       Medications    has a current medication list which includes the following prescription(s): dextroamphetamine sulfate and diazepam.    Vitamins, Minerals, and/or Supplements:  metamucil     Food/Medication Interactions:  Reviewed     Food Allergies or Intolerances:  NKFA     Social History    Marital status:    Employment:  [employer]    Social History     Tobacco Use    Smoking status: Never    Smokeless tobacco: Never   Substance Use Topics    Alcohol use: Yes     Comment: rarely        Lab Reports   Sodium   Date Value Ref Range Status   2021 140 136 - 145 mmol/L Final     Potassium   Date Value Ref Range Status   2021 4.4 3.5 - 5.1 mmol/L " Final     Chloride   Date Value Ref Range Status   11/22/2021 106 95 - 110 mmol/L Final     CO2   Date Value Ref Range Status   11/22/2021 24 23 - 29 mmol/L Final     Glucose   Date Value Ref Range Status   11/22/2021 88 70 - 110 mg/dL Final     BUN   Date Value Ref Range Status   11/22/2021 13 6 - 20 mg/dL Final     Creatinine   Date Value Ref Range Status   11/22/2021 0.8 0.5 - 1.4 mg/dL Final     Calcium   Date Value Ref Range Status   11/22/2021 9.4 8.7 - 10.5 mg/dL Final     Total Protein   Date Value Ref Range Status   11/22/2021 6.8 6.0 - 8.4 g/dL Final     Albumin   Date Value Ref Range Status   11/22/2021 4.3 3.5 - 5.2 g/dL Final     Total Bilirubin   Date Value Ref Range Status   11/22/2021 1.2 (H) 0.1 - 1.0 mg/dL Final     Comment:     For infants and newborns, interpretation of results should be based  on gestational age, weight and in agreement with clinical  observations.    Premature Infant recommended reference ranges:  Up to 24 hours.............<8.0 mg/dL  Up to 48 hours............<12.0 mg/dL  3-5 days..................<15.0 mg/dL  6-29 days.................<15.0 mg/dL       Alkaline Phosphatase   Date Value Ref Range Status   11/22/2021 43 (L) 55 - 135 U/L Final     AST   Date Value Ref Range Status   11/22/2021 12 10 - 40 U/L Final     ALT   Date Value Ref Range Status   11/22/2021 35 10 - 44 U/L Final     Anion Gap   Date Value Ref Range Status   11/22/2021 10 8 - 16 mmol/L Final     eGFR if    Date Value Ref Range Status   11/22/2021 >60 >60 mL/min/1.73 m^2 Final     eGFR if non    Date Value Ref Range Status   11/22/2021 >60 >60 mL/min/1.73 m^2 Final     Comment:     Calculation used to obtain the estimated glomerular filtration  rate (eGFR) is the CKD-EPI equation.         Lab Results   Component Value Date    WBC 5.41 11/22/2021    HGB 14.9 11/22/2021    HCT 43.7 11/22/2021    MCV 83 11/22/2021     11/22/2021        Lab Results   Component Value Date     CHOL 157 11/22/2021     Lab Results   Component Value Date    HDL 34 (L) 11/22/2021     Lab Results   Component Value Date    LDLCALC 98.6 11/22/2021     Lab Results   Component Value Date    TRIG 122 11/22/2021     Lab Results   Component Value Date    CHOLHDL 21.7 11/22/2021     Lab Results   Component Value Date    HGBA1C 5.4 11/22/2021     BP Readings from Last 1 Encounters:   08/19/22 125/80       Food History  No recall provided.   Food information provided above    Exercise History:  4-5 times weekly, weight training    Cultural/Spiritual/Personal Preferences:  None identified    Support System:  spouse    State of Change:  Contemplation    Barriers to Change:  none    Diagnosis    Altered nutrition related laboratory values related to lack of cardio activity outside of daily activities as evidenced by HDL 38.    Intervention    RMR (Method:  [place]. Jeor):  2041 kcal  Activity Factor:  1.3    AMULIK:  2653 kcal    Goals:  Increase cardio activity.  2. Plate method at meals.  3. Increase daily fiber intake.     Nutrition Education  The following education was provided to the patient:  Discussed meal planning/[employer] My Plate design.  Discussed label reading.  Discussed Heart Healthy Eating.  Suggested dietary modifications based on current dietary behaviors and individual food preferences.  *Lab results were pending at time of consult and so, not discussed with patient.    Patient verbalized understanding of nutrition education and recommendations received.    Handouts Provided  Meal Planning Guide  Restaurant Guide  Eat Fit Shopping List  Plate Method  Healthy Snack List  Fueling Well On-The-Go    Monitoring/Evaluation    Monitor the following:  Weight  BMI  Caloric intake  Labs:  lipid panel, glucose, HgbA1c    Follow Up Plan:  Communication with referring healthcare provider is unnecessary at this time as patient presented as part of annual wellness exam.  However, will follow up with patient in 1-2  years.

## 2022-10-31 ENCOUNTER — TELEPHONE (OUTPATIENT)
Dept: CARDIOLOGY | Facility: HOSPITAL | Age: 42
End: 2022-10-31
Payer: COMMERCIAL

## 2022-11-30 ENCOUNTER — TELEPHONE (OUTPATIENT)
Dept: UROLOGY | Facility: CLINIC | Age: 42
End: 2022-11-30
Payer: COMMERCIAL

## 2022-11-30 NOTE — TELEPHONE ENCOUNTER
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 Sent pt a message on his portal.

## 2022-12-05 ENCOUNTER — OFFICE VISIT (OUTPATIENT)
Dept: INTERNAL MEDICINE | Facility: CLINIC | Age: 42
End: 2022-12-05
Payer: COMMERCIAL

## 2022-12-05 DIAGNOSIS — G89.29 CHRONIC RIGHT-SIDED LOW BACK PAIN WITH RIGHT-SIDED SCIATICA: Primary | ICD-10-CM

## 2022-12-05 DIAGNOSIS — M54.41 CHRONIC RIGHT-SIDED LOW BACK PAIN WITH RIGHT-SIDED SCIATICA: Primary | ICD-10-CM

## 2022-12-05 PROCEDURE — 99214 OFFICE O/P EST MOD 30 MIN: CPT | Mod: 95,,, | Performed by: FAMILY MEDICINE

## 2022-12-05 PROCEDURE — 1160F PR REVIEW ALL MEDS BY PRESCRIBER/CLIN PHARMACIST DOCUMENTED: ICD-10-PCS | Mod: CPTII,95,, | Performed by: FAMILY MEDICINE

## 2022-12-05 PROCEDURE — 1160F RVW MEDS BY RX/DR IN RCRD: CPT | Mod: CPTII,95,, | Performed by: FAMILY MEDICINE

## 2022-12-05 PROCEDURE — 1159F PR MEDICATION LIST DOCUMENTED IN MEDICAL RECORD: ICD-10-PCS | Mod: CPTII,95,, | Performed by: FAMILY MEDICINE

## 2022-12-05 PROCEDURE — 1159F MED LIST DOCD IN RCRD: CPT | Mod: CPTII,95,, | Performed by: FAMILY MEDICINE

## 2022-12-05 PROCEDURE — 3044F PR MOST RECENT HEMOGLOBIN A1C LEVEL <7.0%: ICD-10-PCS | Mod: CPTII,95,, | Performed by: FAMILY MEDICINE

## 2022-12-05 PROCEDURE — 3044F HG A1C LEVEL LT 7.0%: CPT | Mod: CPTII,95,, | Performed by: FAMILY MEDICINE

## 2022-12-05 PROCEDURE — 99214 PR OFFICE/OUTPT VISIT, EST, LEVL IV, 30-39 MIN: ICD-10-PCS | Mod: 95,,, | Performed by: FAMILY MEDICINE

## 2022-12-05 RX ORDER — GABAPENTIN 400 MG/1
400 CAPSULE ORAL NIGHTLY
Qty: 30 CAPSULE | Refills: 0 | Status: SHIPPED | OUTPATIENT
Start: 2022-12-05 | End: 2023-04-27

## 2022-12-05 RX ORDER — METHYLPREDNISOLONE 4 MG/1
TABLET ORAL
Qty: 21 EACH | Refills: 1 | Status: SHIPPED | OUTPATIENT
Start: 2022-12-05 | End: 2023-01-09

## 2022-12-05 NOTE — PROGRESS NOTES
Subjective:   Patient ID: Theron Manrique is a 42 y.o. male.  Chief Complaint:  No chief complaint on file.    The patient location is: Work  The chief complaint leading to consultation is: Recurrent low back pain    Visit type: audiovisual    Face to Face time with patient: 15 minutes  20 minutes of total time spent on the encounter, which includes face to face time and non-face to face time preparing to see the patient (eg, review of tests), Obtaining and/or reviewing separately obtained history, Documenting clinical information in the electronic or other health record, Independently interpreting results (not separately reported) and communicating results to the patient/family/caregiver, or Care coordination (not separately reported).     Each patient to whom he or she provides medical services by telemedicine is:  (1) informed of the relationship between the physician and patient and the respective role of any other health care provider with respect to management of the patient; and (2) notified that he or she may decline to receive medical services by telemedicine and may withdraw from such care at any time.    Presents to discuss treatment for recurrent low back pain  However episodes.  Only once every 12-15 months   Midline location with right sided buttock pain down leg consistent with sciatica   Sees chiropractor was anatomically no abnormality except for significant muscle spasm L2-L4 area   No imaging on file   Main question is when attack what else he can take to break other than current manipulation and dry needling   Does not want to take any muscle relaxants        Back Pain  This is a recurrent problem. The current episode started in the past 7 days. The problem occurs intermittently. The problem has been waxing and waning since onset. The pain is present in the lumbar spine. The quality of the pain is described as aching and shooting. The pain radiates to the right foot and right thigh. The pain  is at a severity of 5/10. The pain is moderate. The pain is Worse during the day. The symptoms are aggravated by bending, position, sitting and twisting. Stiffness is present In the morning and at night. Pertinent negatives include no abdominal pain, bladder incontinence, bowel incontinence, chest pain, dysuria, fever, headaches, leg pain, numbness, paresis, paresthesias, pelvic pain, perianal numbness, tingling, weakness or weight loss. He has tried NSAIDs and heat for the symptoms. The treatment provided moderate relief.     Review of Systems   Constitutional:  Negative for chills, fatigue, fever and weight loss.   Cardiovascular:  Negative for chest pain, palpitations and leg swelling.   Gastrointestinal:  Negative for abdominal pain, bowel incontinence, constipation, diarrhea, nausea and vomiting.   Genitourinary:  Negative for bladder incontinence, decreased urine volume, difficulty urinating, dysuria, flank pain, frequency, hematuria, pelvic pain and urgency.   Musculoskeletal:  Positive for back pain and myalgias. Negative for arthralgias, gait problem, joint swelling, neck pain and neck stiffness.   Skin:  Negative for rash.   Neurological:  Negative for dizziness, tingling, tremors, syncope, weakness, light-headedness, numbness, headaches and paresthesias.   Psychiatric/Behavioral:  Negative for sleep disturbance. The patient is not nervous/anxious.      Objective:   Physical Exam  Constitutional:       Appearance: Normal appearance. He is well-developed.   Neurological:      Mental Status: He is alert.   Psychiatric:         Attention and Perception: Attention normal.         Mood and Affect: Mood and affect normal.     Assessment:       ICD-10-CM ICD-9-CM   1. Chronic right-sided low back pain with right-sided sciatica  M54.41 724.2    G89.29 724.3     338.29     Plan:   Chronic right-sided low back pain with right-sided sciatica  -     methylPREDNISolone (MEDROL DOSEPACK) 4 mg tablet; Take as directed on  pack  Dispense: 21 each; Refill: 1  -     gabapentin (NEURONTIN) 400 MG capsule; Take 1 capsule (400 mg total) by mouth every evening.  Dispense: 30 capsule; Refill: 0    Based on frequency, no daily preventative medication recommended at this time  Take Medrol Dosepak as directed and gabapentin 400 mg nightly while on Dosepak for acute/recurrent flare-ups   Discussed if frequency becomes more than 2 in a 3 month period, 3 in a six-month period, or 4 times year would need additional evaluation to include imaging and then need consideration for preventative type medication  Patient expresses agreement understanding to the above plan   Return to clinic as scheduled/as needed

## 2022-12-07 NOTE — LETTER
December 7, 2022    Theron Coreyophelia Jaramilloer  [address] [place] [address]             The [address]  [address] [facility]  [place] [address]  Phone: [phone]  Fax: [phone] Dear Mr. Manrique:    On 10/27/22, it was a pleasure to see you  and perform your Executive Health evaluation. At the time of this visit, you are 42 years old.      YOUR PAST MEDICAL HISTORY includes ADHD.     YOUR PAST SURGICAL HISTORY includes a tonsillectomy, wisdom teeth extraction, vasectomy, and excision of a benign skin lesion.     YOUR CURRENT MEDICATIONS include Dexedrine Spansules 10 mg twice a day.     You do not have ANY KNOWN DRUG ALLERGIES.     YOUR SOCIAL HISTORY includes employment by [employer].  You are  with one child.   You drink alcohol infrequently.  You denied any tobacco or illicit drug use.       YOUR FAMILY HISTORY includes your father, alive, with hearing loss and prostate cancer diagnosed in his 70s. Your mother, alive, with no known medical problems. Multiple family members have psoriasis. Your paternal grandfather possibly had colon and/or prostate cancer.     YOUR ROUTINE HEALTH MAINTENANCE includes a tetanus booster November 2018, declined flu vaccine. Consider new covid vaccine.      /65   Pulse 71   Temp 96.6 °F (35.9 °C) (Tympanic)   Ht 6' (1.829 m)   Wt 109 kg (240 lb 4.8 oz)   BMI 32.59 kg/m²     Your physical exam is normal.    Your lab results were all within acceptable ranges.    Your EKG is normal.    Again I would like to thank you for allowing me to participate in your executive health physical.  At this time it appears that you are in good overall health.  It is recommended that you maintain a heart healthy diet, regular exercise, and regular use of sunscreen along with regular checkups.      Please do not hesitate to contact me if you have any questions or concerns or if I can be of further assistance.       Sincerely,      Peter Muñoz MD

## 2022-12-07 NOTE — PROGRESS NOTES
Subjective:      Patient ID: Theron Manrique is a 42 y.o. male.    Chief Complaint: No chief complaint on file.    HPI  Review of Systems  Objective:   There were no vitals taken for this visit.    Physical Exam    Lab Results   Component Value Date    WBC 5.17 10/27/2022    HGB 15.0 10/27/2022    HGB 14.9 11/22/2021    HGB 14.6 12/24/2020    HCT 43.5 10/27/2022    MCV 84 10/27/2022    MCV 83 11/22/2021    MCV 87 12/24/2020     10/27/2022    CHOL 156 10/27/2022    TRIG 104 10/27/2022    HDL 38 (L) 10/27/2022    LDLCALC 97.2 10/27/2022    LDLCALC 98.6 11/22/2021    LDLCALC 108.0 12/24/2020    ALT 32 10/27/2022    AST 10 10/27/2022     10/27/2022    K 4.5 10/27/2022     10/27/2022    CO2 24 10/27/2022    BUN 14 10/27/2022    CREATININE 0.9 10/27/2022    CREATININE 0.8 11/22/2021    CREATININE 0.9 12/24/2020    EGFRNORACEVR >60 10/27/2022    TSH 1.271 10/27/2022    TSH 1.769 11/22/2021    PSA 0.56 10/27/2022    GLU 90 10/27/2022    HGBA1C 5.3 10/27/2022    HGBA1C 5.4 11/22/2021          The 10-year ASCVD risk score (Dora SOLANO, et al., 2019) is: 0.8%    Values used to calculate the score:      Age: 42 years      Sex: Male      Is Non- : No      Diabetic: No      Tobacco smoker: No      Systolic Blood Pressure: 116 mmHg      Is BP treated: No      HDL Cholesterol: 38 mg/dL      Total Cholesterol: 156 mg/dL     Assessment:     No diagnosis found.  Plan:       There are no Patient Instructions on file for this visit.    No future appointments.    Lab Frequency Next Occurrence   Ambulatory referral/consult to Dermatology Once 11/03/2022       No follow-ups on file.

## 2023-01-09 ENCOUNTER — OFFICE VISIT (OUTPATIENT)
Dept: URGENT CARE | Facility: CLINIC | Age: 43
End: 2023-01-09
Payer: COMMERCIAL

## 2023-01-09 VITALS
WEIGHT: 240 LBS | DIASTOLIC BLOOD PRESSURE: 72 MMHG | BODY MASS INDEX: 32.51 KG/M2 | SYSTOLIC BLOOD PRESSURE: 129 MMHG | TEMPERATURE: 99 F | OXYGEN SATURATION: 96 % | RESPIRATION RATE: 17 BRPM | HEIGHT: 72 IN | HEART RATE: 102 BPM

## 2023-01-09 DIAGNOSIS — R09.81 SINUS CONGESTION: Primary | ICD-10-CM

## 2023-01-09 DIAGNOSIS — J06.9 UPPER RESPIRATORY TRACT INFECTION, UNSPECIFIED TYPE: ICD-10-CM

## 2023-01-09 LAB
CTP QC/QA: YES
CTP QC/QA: YES
POC MOLECULAR INFLUENZA A AGN: NEGATIVE
POC MOLECULAR INFLUENZA B AGN: NEGATIVE
SARS-COV-2 AG RESP QL IA.RAPID: NEGATIVE

## 2023-01-09 PROCEDURE — 3078F DIAST BP <80 MM HG: CPT | Mod: CPTII,S$GLB,, | Performed by: NURSE PRACTITIONER

## 2023-01-09 PROCEDURE — 99214 PR OFFICE/OUTPT VISIT, EST, LEVL IV, 30-39 MIN: ICD-10-PCS | Mod: S$GLB,,, | Performed by: NURSE PRACTITIONER

## 2023-01-09 PROCEDURE — 87811 SARS CORONAVIRUS 2 ANTIGEN POCT, MANUAL READ: ICD-10-PCS | Mod: QW,S$GLB,, | Performed by: NURSE PRACTITIONER

## 2023-01-09 PROCEDURE — 3074F PR MOST RECENT SYSTOLIC BLOOD PRESSURE < 130 MM HG: ICD-10-PCS | Mod: CPTII,S$GLB,, | Performed by: NURSE PRACTITIONER

## 2023-01-09 PROCEDURE — 3078F PR MOST RECENT DIASTOLIC BLOOD PRESSURE < 80 MM HG: ICD-10-PCS | Mod: CPTII,S$GLB,, | Performed by: NURSE PRACTITIONER

## 2023-01-09 PROCEDURE — 3008F PR BODY MASS INDEX (BMI) DOCUMENTED: ICD-10-PCS | Mod: CPTII,S$GLB,, | Performed by: NURSE PRACTITIONER

## 2023-01-09 PROCEDURE — 99214 OFFICE O/P EST MOD 30 MIN: CPT | Mod: S$GLB,,, | Performed by: NURSE PRACTITIONER

## 2023-01-09 PROCEDURE — 1160F PR REVIEW ALL MEDS BY PRESCRIBER/CLIN PHARMACIST DOCUMENTED: ICD-10-PCS | Mod: CPTII,S$GLB,, | Performed by: NURSE PRACTITIONER

## 2023-01-09 PROCEDURE — 87502 INFLUENZA DNA AMP PROBE: CPT | Mod: QW,S$GLB,, | Performed by: NURSE PRACTITIONER

## 2023-01-09 PROCEDURE — 87502 POCT INFLUENZA A/B MOLECULAR: ICD-10-PCS | Mod: QW,S$GLB,, | Performed by: NURSE PRACTITIONER

## 2023-01-09 PROCEDURE — 1159F MED LIST DOCD IN RCRD: CPT | Mod: CPTII,S$GLB,, | Performed by: NURSE PRACTITIONER

## 2023-01-09 PROCEDURE — 1159F PR MEDICATION LIST DOCUMENTED IN MEDICAL RECORD: ICD-10-PCS | Mod: CPTII,S$GLB,, | Performed by: NURSE PRACTITIONER

## 2023-01-09 PROCEDURE — 1160F RVW MEDS BY RX/DR IN RCRD: CPT | Mod: CPTII,S$GLB,, | Performed by: NURSE PRACTITIONER

## 2023-01-09 PROCEDURE — 3074F SYST BP LT 130 MM HG: CPT | Mod: CPTII,S$GLB,, | Performed by: NURSE PRACTITIONER

## 2023-01-09 PROCEDURE — 3008F BODY MASS INDEX DOCD: CPT | Mod: CPTII,S$GLB,, | Performed by: NURSE PRACTITIONER

## 2023-01-09 PROCEDURE — 87811 SARS-COV-2 COVID19 W/OPTIC: CPT | Mod: QW,S$GLB,, | Performed by: NURSE PRACTITIONER

## 2023-01-09 RX ORDER — CETIRIZINE HYDROCHLORIDE 10 MG/1
10 TABLET ORAL DAILY
Qty: 30 TABLET | Refills: 0 | Status: SHIPPED | OUTPATIENT
Start: 2023-01-09 | End: 2023-10-05

## 2023-01-09 RX ORDER — PREDNISONE 20 MG/1
20 TABLET ORAL DAILY
Qty: 3 TABLET | Refills: 0 | Status: SHIPPED | OUTPATIENT
Start: 2023-01-09 | End: 2023-01-12

## 2023-01-09 NOTE — PROGRESS NOTES
Subjective:       Patient ID: Theron Manrique is a 42 y.o. male.    Vitals:  height is 6' (1.829 m) and weight is 108.9 kg (240 lb). His tympanic temperature is 98.9 °F (37.2 °C). His blood pressure is 129/72 and his pulse is 102. His respiration is 17 and oxygen saturation is 96%.     Chief Complaint: Sinus Problem (Nasal congestion, and fever)    Pt presents to the clinic today with nasal congestion, sinus pressure behind the eyes,  and post nasal drip that all began on yesterday after working in the yard.    Sinus Problem  The current episode started yesterday. The problem has been gradually worsening since onset. There has been no fever. His pain is at a severity of 5/10. The pain is moderate. Associated symptoms include congestion and sinus pressure. Pertinent negatives include no chills, coughing, ear pain, headaches or sneezing. Treatments tried: Theraflu. The treatment provided mild relief.     Constitution: Positive for fatigue. Negative for chills and fever.   HENT:  Positive for congestion and sinus pressure. Negative for ear pain.    Cardiovascular:  Negative for chest pain.   Eyes:  Negative for photophobia.   Respiratory:  Negative for cough.    Gastrointestinal:  Negative for nausea and vomiting.   Musculoskeletal:  Negative for pain.   Allergic/Immunologic: Positive for environmental allergies and seasonal allergies. Negative for sneezing.   Neurological:  Negative for headaches and disorientation.   Psychiatric/Behavioral:  Negative for disorientation and confusion.      Objective:      Physical Exam   Constitutional: He is oriented to person, place, and time. He appears well-developed. He is cooperative.  Non-toxic appearance. He does not appear ill. No distress.   HENT:   Head: Normocephalic and atraumatic.   Ears:   Right Ear: Hearing, tympanic membrane, external ear and ear canal normal.   Left Ear: Hearing, tympanic membrane, external ear and ear canal normal.   Nose: Nose normal. No mucosal  edema, rhinorrhea or nasal deformity. No epistaxis. Right sinus exhibits no maxillary sinus tenderness and no frontal sinus tenderness. Left sinus exhibits no maxillary sinus tenderness and no frontal sinus tenderness.   Mouth/Throat: Uvula is midline, oropharynx is clear and moist and mucous membranes are normal. No trismus in the jaw. Normal dentition. No uvula swelling. No oropharyngeal exudate, posterior oropharyngeal edema or posterior oropharyngeal erythema.   Eyes: Conjunctivae and lids are normal. No scleral icterus.   Neck: Trachea normal and phonation normal. Neck supple. No edema present. No erythema present. No neck rigidity present.   Cardiovascular: Normal rate, regular rhythm, normal heart sounds and normal pulses.   Pulmonary/Chest: Effort normal and breath sounds normal. No respiratory distress. He has no decreased breath sounds. He has no rhonchi.   Abdominal: Normal appearance.   Musculoskeletal: Normal range of motion.         General: No deformity. Normal range of motion.   Neurological: He is alert and oriented to person, place, and time. He exhibits normal muscle tone. Coordination normal.   Skin: Skin is warm, dry, intact, not diaphoretic and not pale.   Psychiatric: His speech is normal and behavior is normal. Judgment and thought content normal.   Nursing note and vitals reviewed.      Assessment:       1. Sinus congestion    2. Upper respiratory tract infection, unspecified type          Plan:         Sinus congestion  -     SARS Coronavirus 2 Antigen, POCT Manual Read  -     POCT Influenza A/B MOLECULAR    Upper respiratory tract infection, unspecified type  -     cetirizine (ZYRTEC) 10 MG tablet; Take 1 tablet (10 mg total) by mouth once daily.  Dispense: 30 tablet; Refill: 0  -     predniSONE (DELTASONE) 20 MG tablet; Take 1 tablet (20 mg total) by mouth once daily. for 3 days  Dispense: 3 tablet; Refill: 0         Patient counseled on symptomatic treatment.   - Rest  - Hydration-- 64  ounces fluid per day  - Cool mist humidifier/vaporizer  - Nasal saline/steroids  - Antihistamines  - Mucinex  - Gargles and lozenges for sore throat  - OTC pain/fever relievers    Follow up with PCP or ER immediately for worsening, new symptoms or no improvement. Go to ER if you develop fever of 103 or higher, chest pain, shortness of breath, upper back pain, stiff neck or severe headache.      Diagnosis, treatment, AVS reviewed with patient. Patient understands and agrees with plan

## 2023-04-27 ENCOUNTER — HOSPITAL ENCOUNTER (OUTPATIENT)
Dept: RADIOLOGY | Facility: HOSPITAL | Age: 43
Discharge: HOME | End: 2023-04-27
Attending: FAMILY MEDICINE
Payer: COMMERCIAL

## 2023-04-27 ENCOUNTER — OFFICE VISIT (OUTPATIENT)
Dept: INTERNAL MEDICINE | Facility: CLINIC | Age: 43
End: 2023-04-27
Payer: COMMERCIAL

## 2023-04-27 VITALS
DIASTOLIC BLOOD PRESSURE: 74 MMHG | WEIGHT: 246.5 LBS | OXYGEN SATURATION: 95 % | TEMPERATURE: 98 F | HEIGHT: 73 IN | SYSTOLIC BLOOD PRESSURE: 128 MMHG | BODY MASS INDEX: 32.67 KG/M2 | HEART RATE: 106 BPM

## 2023-04-27 DIAGNOSIS — F98.8 ATTENTION DEFICIT DISORDER (ADD) WITHOUT HYPERACTIVITY: Primary | ICD-10-CM

## 2023-04-27 DIAGNOSIS — M54.41 CHRONIC RIGHT-SIDED LOW BACK PAIN WITH RIGHT-SIDED SCIATICA: ICD-10-CM

## 2023-04-27 DIAGNOSIS — G89.29 CHRONIC RIGHT-SIDED LOW BACK PAIN WITH RIGHT-SIDED SCIATICA: ICD-10-CM

## 2023-04-27 PROCEDURE — 99214 PR OFFICE/OUTPT VISIT, EST, LEVL IV, 30-39 MIN: ICD-10-PCS | Mod: S$GLB,,, | Performed by: FAMILY MEDICINE

## 2023-04-27 PROCEDURE — 3008F PR BODY MASS INDEX (BMI) DOCUMENTED: ICD-10-PCS | Mod: CPTII,S$GLB,, | Performed by: FAMILY MEDICINE

## 2023-04-27 PROCEDURE — 3008F BODY MASS INDEX DOCD: CPT | Mod: CPTII,S$GLB,, | Performed by: FAMILY MEDICINE

## 2023-04-27 PROCEDURE — 1159F PR MEDICATION LIST DOCUMENTED IN MEDICAL RECORD: ICD-10-PCS | Mod: CPTII,S$GLB,, | Performed by: FAMILY MEDICINE

## 2023-04-27 PROCEDURE — 72100 X-RAY EXAM L-S SPINE 2/3 VWS: CPT | Mod: 26,,, | Performed by: RADIOLOGY

## 2023-04-27 PROCEDURE — 99999 PR PBB SHADOW E&M-EST. PATIENT-LVL III: ICD-10-PCS | Mod: PBBFAC,,, | Performed by: FAMILY MEDICINE

## 2023-04-27 PROCEDURE — 3078F PR MOST RECENT DIASTOLIC BLOOD PRESSURE < 80 MM HG: ICD-10-PCS | Mod: CPTII,S$GLB,, | Performed by: FAMILY MEDICINE

## 2023-04-27 PROCEDURE — 3074F PR MOST RECENT SYSTOLIC BLOOD PRESSURE < 130 MM HG: ICD-10-PCS | Mod: CPTII,S$GLB,, | Performed by: FAMILY MEDICINE

## 2023-04-27 PROCEDURE — 1159F MED LIST DOCD IN RCRD: CPT | Mod: CPTII,S$GLB,, | Performed by: FAMILY MEDICINE

## 2023-04-27 PROCEDURE — 1160F PR REVIEW ALL MEDS BY PRESCRIBER/CLIN PHARMACIST DOCUMENTED: ICD-10-PCS | Mod: CPTII,S$GLB,, | Performed by: FAMILY MEDICINE

## 2023-04-27 PROCEDURE — 1160F RVW MEDS BY RX/DR IN RCRD: CPT | Mod: CPTII,S$GLB,, | Performed by: FAMILY MEDICINE

## 2023-04-27 PROCEDURE — 3074F SYST BP LT 130 MM HG: CPT | Mod: CPTII,S$GLB,, | Performed by: FAMILY MEDICINE

## 2023-04-27 PROCEDURE — 99214 OFFICE O/P EST MOD 30 MIN: CPT | Mod: S$GLB,,, | Performed by: FAMILY MEDICINE

## 2023-04-27 PROCEDURE — 72100 X-RAY EXAM L-S SPINE 2/3 VWS: CPT | Mod: TC

## 2023-04-27 PROCEDURE — 3078F DIAST BP <80 MM HG: CPT | Mod: CPTII,S$GLB,, | Performed by: FAMILY MEDICINE

## 2023-04-27 PROCEDURE — 99999 PR PBB SHADOW E&M-EST. PATIENT-LVL III: CPT | Mod: PBBFAC,,, | Performed by: FAMILY MEDICINE

## 2023-04-27 PROCEDURE — 72100 XR LUMBAR SPINE AP AND LATERAL: ICD-10-PCS | Mod: 26,,, | Performed by: RADIOLOGY

## 2023-04-27 RX ORDER — DEXTROAMPHETAMINE SULFATE 10 MG/1
10 CAPSULE, EXTENDED RELEASE ORAL 2 TIMES DAILY
Qty: 60 CAPSULE | Refills: 0 | Status: SHIPPED | OUTPATIENT
Start: 2023-04-27 | End: 2023-06-02 | Stop reason: SDUPTHER

## 2023-04-27 NOTE — PROGRESS NOTES
Subjective:   Patient ID: Theron Manrique is a 43 y.o. male.  Chief Complaint:  Follow-up    Presents for follow-up on ADHD and low back pain     ADHD follow-up  On Dexedrine Spansules 10 mg twice a day  Effective with symptoms controlled on present medication and dose helping compensate for deficits at work/school.  Duration is appropriate.    No mood instability, tics, or disordered sleep, or other apparent adverse effects.    Tolerating current treatment well.   No behaviors to suggest inappropriate use of prescribed medications.  [place] Board of Pharmacy Controlled Prescription Drug Monitoring database was queried and showed no activity to suggest abuse, diversion, or other inappropriate use of prescription medications.     Last visit December 2022 telemedicine for back pain   Treated with gabapentin 400 mg nightly Medrol Dosepak  Pain resolved/improved   Sees chiropractor on regular basis for massage therapy and manipulation   Does not have any imaging on file   Questions if there is anything else that should be done to help minimize/prevent flare ups     Last annual physical exam and lab work stable October 2022     No new complaints or concerns today    Review of Systems   Constitutional:  Positive for fatigue. Negative for activity change, appetite change and unexpected weight change.   HENT:  Negative for hearing loss, rhinorrhea and trouble swallowing.    Eyes:  Negative for discharge and visual disturbance.   Respiratory:  Negative for chest tightness, shortness of breath and wheezing.    Cardiovascular:  Negative for chest pain, palpitations and leg swelling.   Gastrointestinal:  Negative for abdominal pain, blood in stool, constipation, diarrhea, nausea and vomiting.   Endocrine: Negative for polydipsia and polyuria.   Genitourinary:  Negative for difficulty urinating, hematuria and urgency.   Musculoskeletal:  Positive for back pain. Negative for arthralgias, joint swelling, myalgias and neck  "pain.   Skin:  Negative for rash.   Neurological:  Negative for dizziness, tremors, weakness, light-headedness and headaches.   Psychiatric/Behavioral:  Negative for agitation, behavioral problems, confusion, decreased concentration, dysphoric mood, hallucinations and sleep disturbance. The patient is not nervous/anxious and is not hyperactive.      Objective:   /74 (BP Location: Left arm, Patient Position: Sitting, BP Method: Large (Manual))   Pulse 106   Temp 97.9 °F (36.6 °C) (Tympanic)   Ht 6' 1" (1.854 m)   Wt 111.8 kg (246 lb 7.6 oz)   SpO2 95%   BMI 32.52 kg/m²     Physical Exam  Vitals and nursing note reviewed.   Constitutional:       Appearance: He is well-developed and normal weight.   Cardiovascular:      Rate and Rhythm: Normal rate and regular rhythm.      Heart sounds: Normal heart sounds.   Pulmonary:      Effort: Pulmonary effort is normal.      Breath sounds: Normal breath sounds and air entry.   Abdominal:      General: There is no distension.      Palpations: Abdomen is soft.      Tenderness: There is no abdominal tenderness.   Musculoskeletal:      Right lower leg: No edema.      Left lower leg: No edema.   Skin:     Findings: No rash.   Neurological:      Mental Status: He is alert.   Psychiatric:         Attention and Perception: Attention normal. He is attentive.         Mood and Affect: Mood and affect normal.         Behavior: Behavior normal. Behavior is not hyperactive. Behavior is cooperative.         Cognition and Memory: Cognition and memory normal.     Assessment:       ICD-10-CM ICD-9-CM   1. Attention deficit disorder (ADD) without hyperactivity  F98.8 314.00   2. Chronic right-sided low back pain with right-sided sciatica  M54.41 724.2    G89.29 724.3     338.29     Plan:   Attention deficit disorder (ADD) without hyperactivity  -     dextroamphetamine sulfate (DEXEDRINE SPANSULE) 10 MG 24 hr capsule; Take 1 capsule (10 mg total) by mouth 2 (two) times a day.  Dispense: " 60 capsule; Refill: 0  ADHD, stable, no side effects, controlled on present medication  Continue stimulant at present dose  OK to refill monthly x 2    Chronic right-sided low back pain with right-sided sciatica  -     X-Ray Lumbar Spine Ap And Lateral; Future; Expected date: 04/27/2023  Stable and pain-free today  Okay to continue per chiropractor as needed   Check imaging to see if any significant underlying chronic degenerative disease   Additional evaluation and treatment as needed based  on results    RTC in 3 months  Telemedicine visit appropriate patient prefers

## 2023-05-03 PROBLEM — M47.817 FACET ARTHRITIS OF LUMBOSACRAL REGION: Status: ACTIVE | Noted: 2023-05-03

## 2023-05-26 NOTE — PROCEDURES
Procedures    Procedure: Vasectomy    Procedure in Detail: After consent was obtained, the patient was prepped and draped in normal sterile fashion in the supine position for vasectomy. After infiltrating the skin with 1% lidocaine,  a single midline incision was made with a #15 blade. The right vas was isolated and 1% lidocaine was instilled around the vas. A tunnel was created with blunt dissection with hemostats. Ring forceps were used to isolate the vas and bring it out of the incision. The sheath overlying the vas was dissected away, and a length of vas exposed. A 2.5 cm segment of vas was excised, and each end was cauterized and the abdominal side of the vas was clipped. After irrigation and assuring excellent hemostasis, the two ends of the vas were returned to the scrotum. This was repeated on the left side. The left and right vas segments were sent to pathology for examination. The patient tolerated the procedure well.     Blood loss: scant  Blood given: none  Drains: none  Fluids: none  Path: as above    Follow Up: Pain control with tylenol and motrin. Patient and partner must use other forms of birth control until negative semen analysis. RTC 2 months for semen analysis. Call or visit sooner if any difficulties arise.    Catarino Kerns MD     Elidel Counseling: Patient may experience a mild burning sensation during topical application. Elidel is not approved in children less than 2 years of age. There have been case reports of hematologic and skin malignancies in patients using topical calcineurin inhibitors although causality is questionable.

## 2023-06-02 DIAGNOSIS — F98.8 ATTENTION DEFICIT DISORDER (ADD) WITHOUT HYPERACTIVITY: ICD-10-CM

## 2023-06-02 NOTE — TELEPHONE ENCOUNTER
No care due was identified.  Health [facility] Embedded Care Due Messages. Reference number: [id number].   6/02/2023 5:15:25 AM CDT

## 2023-06-05 RX ORDER — DEXTROAMPHETAMINE SULFATE 10 MG/1
10 CAPSULE, EXTENDED RELEASE ORAL 2 TIMES DAILY
Qty: 60 CAPSULE | Refills: 0 | Status: SHIPPED | OUTPATIENT
Start: 2023-06-05 | End: 2023-06-29 | Stop reason: SDUPTHER

## 2023-06-29 DIAGNOSIS — F98.8 ATTENTION DEFICIT DISORDER (ADD) WITHOUT HYPERACTIVITY: ICD-10-CM

## 2023-06-29 RX ORDER — DEXTROAMPHETAMINE SULFATE 10 MG/1
10 CAPSULE, EXTENDED RELEASE ORAL 2 TIMES DAILY
Qty: 60 CAPSULE | Refills: 0 | Status: SHIPPED | OUTPATIENT
Start: 2023-06-29 | End: 2023-07-27 | Stop reason: SDUPTHER

## 2023-06-29 NOTE — TELEPHONE ENCOUNTER
No care due was identified.  [facility] Embedded Care Due Messages. Reference number: [id number].   6/29/2023 9:50:25 AM CDT

## 2023-07-27 ENCOUNTER — OFFICE VISIT (OUTPATIENT)
Dept: INTERNAL MEDICINE | Facility: CLINIC | Age: 43
End: 2023-07-27
Payer: COMMERCIAL

## 2023-07-27 DIAGNOSIS — F98.8 ATTENTION DEFICIT DISORDER (ADD) WITHOUT HYPERACTIVITY: ICD-10-CM

## 2023-07-27 PROCEDURE — 1160F RVW MEDS BY RX/DR IN RCRD: CPT | Mod: CPTII,95,, | Performed by: FAMILY MEDICINE

## 2023-07-27 PROCEDURE — 99213 PR OFFICE/OUTPT VISIT, EST, LEVL III, 20-29 MIN: ICD-10-PCS | Mod: 95,,, | Performed by: FAMILY MEDICINE

## 2023-07-27 PROCEDURE — 1160F PR REVIEW ALL MEDS BY PRESCRIBER/CLIN PHARMACIST DOCUMENTED: ICD-10-PCS | Mod: CPTII,95,, | Performed by: FAMILY MEDICINE

## 2023-07-27 PROCEDURE — 99213 OFFICE O/P EST LOW 20 MIN: CPT | Mod: 95,,, | Performed by: FAMILY MEDICINE

## 2023-07-27 PROCEDURE — 1159F PR MEDICATION LIST DOCUMENTED IN MEDICAL RECORD: ICD-10-PCS | Mod: CPTII,95,, | Performed by: FAMILY MEDICINE

## 2023-07-27 PROCEDURE — 1159F MED LIST DOCD IN RCRD: CPT | Mod: CPTII,95,, | Performed by: FAMILY MEDICINE

## 2023-07-27 RX ORDER — DEXTROAMPHETAMINE SULFATE 10 MG/1
10 CAPSULE, EXTENDED RELEASE ORAL 2 TIMES DAILY
Qty: 60 CAPSULE | Refills: 0 | Status: SHIPPED | OUTPATIENT
Start: 2023-07-27 | End: 2023-08-30 | Stop reason: SDUPTHER

## 2023-07-27 NOTE — PROGRESS NOTES
Subjective:   Patient ID: Theron Manrique is a 43 y.o. male.  Chief Complaint:  ADHD and Follow-up    The patient location is: [place], [place]  The chief complaint leading to consultation is: ADHD Follow Up    Visit type: audiovisual    Face to Face time with patient: 10 minutes  15 minutes of total time spent on the encounter, which includes face to face time and non-face to face time preparing to see the patient (eg, review of tests), Obtaining and/or reviewing separately obtained history, Documenting clinical information in the electronic or other health record, Independently interpreting results (not separately reported) and communicating results to the patient/family/caregiver, or Care coordination (not separately reported).     Each patient to whom he or she provides medical services by telemedicine is:  (1) informed of the relationship between the physician and patient and the respective role of any other health care provider with respect to management of the patient; and (2) notified that he or she may decline to receive medical services by telemedicine and may withdraw from such care at any time.    ADHD follow-up  On Dexedrine 10 mg twice a day  Effective with symptoms controlled on present medication and dose helping compensate for deficits at work/school.  Duration is appropriate.    No mood instability, tics, or disordered sleep, or other apparent adverse effects.    Tolerating current treatment well.   No behaviors to suggest inappropriate use of prescribed medications.  [place] Board of Pharmacy Controlled Prescription Drug Monitoring database was queried and showed no activity to suggest abuse, diversion, or other inappropriate use of prescription medications.     No additional complaints concerns today.    Review of Systems   Constitutional:  Negative for activity change and unexpected weight change.   HENT:  Negative for hearing loss, rhinorrhea and trouble swallowing.    Eyes:  Negative for  discharge and visual disturbance.   Respiratory:  Negative for chest tightness and wheezing.    Cardiovascular:  Negative for chest pain and palpitations.   Gastrointestinal:  Negative for blood in stool, constipation, diarrhea and vomiting.   Endocrine: Negative for polydipsia and polyuria.   Genitourinary:  Negative for difficulty urinating, hematuria and urgency.   Musculoskeletal:  Negative for arthralgias, joint swelling and neck pain.   Neurological:  Negative for weakness and headaches.   Psychiatric/Behavioral:  Negative for confusion and dysphoric mood.      Objective:     Physical Exam  Constitutional:       Appearance: Normal appearance.   Psychiatric:         Mood and Affect: Mood and affect normal.         Behavior: Behavior normal.         Thought Content: Thought content normal.         Judgment: Judgment normal.     Assessment:       ICD-10-CM ICD-9-CM   1. Attention deficit disorder (ADD) without hyperactivity  F98.8 314.00     Plan:   Attention deficit disorder (ADD) without hyperactivity  -     dextroamphetamine sulfate (DEXEDRINE SPANSULE) 10 MG 24 hr capsule; Take 1 capsule (10 mg total) by mouth 2 (two) times a day.  Dispense: 60 capsule; Refill: 0    ADHD, stable, no side effects, controlled on present medication  Continue stimulant at present dose  OK to refill monthly x 2  Return to clinic September 2023 for executive health evaluation as scheduled

## 2023-08-31 NOTE — PROGRESS NOTES
"Nutrition Assessment  Session Time:  45 minutes      Client name:  Theron Manrique  :  1980  Age:  43 y.o.  Gender:  male    Client states:  [employer] employee present for annual physical.  Last physical and nutrition consultation completed on 2022.    No questions/concerns today.    Lots of home cooked meals.   No fast food intake.  Reports having to cater meals to his two young daughters (ages: almost 2 and almost 5).  Older daughter is pickier with certain foods.   Began making his own breakfast burritos instead of choosing processed items from [employer]. Burritos consists of wheat wraps, eggs, turkey sausage, onions, salsa, and few other ingredients).  Nuts intake has increased.  Also began taking a daily fiber supplement.     Keeps his girls on a schedule of bedtime by 7:30pm to allow for him to wake up for his 3am workouts.     Reports being slightly less active with daily life than usual due to heat outside.         Anthropometrics  Height:  72.99"     Weight:  246 lbs  BMI:  32.46  % Body Fat:  n/a    Clinical Signs/Symptoms  N/V/D:  none noted  Appetite:  good       Past Medical History:   Diagnosis Date    ADHD (attention deficit hyperactivity disorder)        Past Surgical History:   Procedure Laterality Date    MOLE REMOVAL      TONSILLECTOMY      VASECTOMY  2022    WISDOM TOOTH EXTRACTION  2020       Medications    has a current medication list which includes the following prescription(s): cetirizine and dextroamphetamine sulfate.    Vitamins, Minerals, and/or Supplements:  fiber     Food/Medication Interactions:  Reviewed     Food Allergies or Intolerances:  NKFA noted in chart     Social History    Marital status:    Employment:  yes    Social History     Tobacco Use    Smoking status: Former     Current packs/day: 1.50     Average packs/day: 1.5 packs/day for 5.0 years (7.5 ttl pk-yrs)     Types: Cigarettes    Smokeless tobacco: Never   Substance Use Topics    Alcohol " use: Not Currently     Alcohol/week: 2.0 standard drinks of alcohol     Types: 2 Glasses of wine per week     Comment: rarely        Lab Reports   Sodium   Date Value Ref Range Status   10/27/2022 139 136 - 145 mmol/L Final     Potassium   Date Value Ref Range Status   10/27/2022 4.5 3.5 - 5.1 mmol/L Final     Chloride   Date Value Ref Range Status   10/27/2022 107 95 - 110 mmol/L Final     CO2   Date Value Ref Range Status   10/27/2022 24 23 - 29 mmol/L Final     Glucose   Date Value Ref Range Status   10/27/2022 90 70 - 110 mg/dL Final     BUN   Date Value Ref Range Status   10/27/2022 14 6 - 20 mg/dL Final     Creatinine   Date Value Ref Range Status   10/27/2022 0.9 0.5 - 1.4 mg/dL Final     Calcium   Date Value Ref Range Status   10/27/2022 9.6 8.7 - 10.5 mg/dL Final     Total Protein   Date Value Ref Range Status   10/27/2022 6.9 6.0 - 8.4 g/dL Final     Albumin   Date Value Ref Range Status   10/27/2022 4.3 3.5 - 5.2 g/dL Final     Total Bilirubin   Date Value Ref Range Status   10/27/2022 1.0 0.1 - 1.0 mg/dL Final     Comment:     For infants and newborns, interpretation of results should be based  on gestational age, weight and in agreement with clinical  observations.    Premature Infant recommended reference ranges:  Up to 24 hours.............<8.0 mg/dL  Up to 48 hours............<12.0 mg/dL  3-5 days..................<15.0 mg/dL  6-29 days.................<15.0 mg/dL       Alkaline Phosphatase   Date Value Ref Range Status   10/27/2022 45 (L) 55 - 135 U/L Final     AST   Date Value Ref Range Status   10/27/2022 10 10 - 40 U/L Final     ALT   Date Value Ref Range Status   10/27/2022 32 10 - 44 U/L Final     Anion Gap   Date Value Ref Range Status   10/27/2022 8 8 - 16 mmol/L Final     eGFR if    Date Value Ref Range Status   11/22/2021 >60 >60 mL/min/1.73 m^2 Final     eGFR if non    Date Value Ref Range Status   11/22/2021 >60 >60 mL/min/1.73 m^2 Final     Comment:      Calculation used to obtain the estimated glomerular filtration  rate (eGFR) is the CKD-EPI equation.         Lab Results   Component Value Date    WBC 5.17 10/27/2022    HGB 15.0 10/27/2022    HCT 43.5 10/27/2022    MCV 84 10/27/2022     10/27/2022        Lab Results   Component Value Date    CHOL 156 10/27/2022     Lab Results   Component Value Date    HDL 38 (L) 10/27/2022     Lab Results   Component Value Date    LDLCALC 97.2 10/27/2022     Lab Results   Component Value Date    TRIG 104 10/27/2022     Lab Results   Component Value Date    CHOLHDL 24.4 10/27/2022     Lab Results   Component Value Date    HGBA1C 5.3 10/27/2022     BP Readings from Last 1 Encounters:   04/27/23 128/74       Food History  No recall. Nutrition information provided above.     Exercise History:  yes, several days per week    Cultural/Spiritual/Personal Preferences:  None identified    Support System:  spouse    State of Change:  Action    Barriers to Change:  none    Diagnosis    Predicted excessive saturated fat intake related to increase of saturated fat content (meats, eggs) within the past year as evidenced by self reported nutrition information.    Intervention    RMR (Method:  [place] St. Jeor):  2079 kcal  Activity Factor:  1.3    MAULIK:  2702 kcal    Goals:  1.  Decrease saturated fat intake. Sub some egg yolks in morning burritos for just egg whites.   2.  Increase movement in daily activities.       Nutrition Education  The following education was provided to the patient:  Discussed Heart Healthy Eating.  Suggested dietary modifications based on current dietary behaviors and individual food preferences.  *Lab results were pending at time of consult and so, not discussed with patient.    Patient verbalized understanding of nutrition education and recommendations received.    Handouts Provided    Monitoring/Evaluation    Monitor the following:  Weight  BMI  Caloric intake  Labs:  HgbA1c, lipid panel    Follow Up Plan:   Communication with referring healthcare provider is unnecessary at this time as patient presented as part of annual wellness exam.  However, will follow up with patient in 1-2 years.

## 2023-09-01 ENCOUNTER — HOSPITAL ENCOUNTER (OUTPATIENT)
Dept: CARDIOLOGY | Facility: HOSPITAL | Age: 43
Discharge: HOME | End: 2023-09-01
Attending: INTERNAL MEDICINE
Payer: COMMERCIAL

## 2023-09-01 ENCOUNTER — CLINICAL SUPPORT (OUTPATIENT)
Dept: REHABILITATION | Facility: HOSPITAL | Age: 43
End: 2023-09-01
Attending: INTERNAL MEDICINE
Payer: COMMERCIAL

## 2023-09-01 ENCOUNTER — OFFICE VISIT (OUTPATIENT)
Dept: INTERNAL MEDICINE | Facility: CLINIC | Age: 43
End: 2023-09-01
Payer: COMMERCIAL

## 2023-09-01 ENCOUNTER — CLINICAL SUPPORT (OUTPATIENT)
Dept: INTERNAL MEDICINE | Facility: CLINIC | Age: 43
End: 2023-09-01
Payer: COMMERCIAL

## 2023-09-01 VITALS
SYSTOLIC BLOOD PRESSURE: 109 MMHG | BODY MASS INDEX: 32.6 KG/M2 | HEART RATE: 71 BPM | HEIGHT: 73 IN | DIASTOLIC BLOOD PRESSURE: 65 MMHG | WEIGHT: 246 LBS | TEMPERATURE: 98 F

## 2023-09-01 DIAGNOSIS — F98.8 ATTENTION DEFICIT DISORDER (ADD) WITHOUT HYPERACTIVITY: ICD-10-CM

## 2023-09-01 DIAGNOSIS — Z00.00 ROUTINE GENERAL MEDICAL EXAMINATION AT A HEALTH CARE FACILITY: Primary | ICD-10-CM

## 2023-09-01 DIAGNOSIS — Z00.00 ROUTINE GENERAL MEDICAL EXAMINATION AT A HEALTH CARE FACILITY: ICD-10-CM

## 2023-09-01 LAB
ALBUMIN SERPL BCP-MCNC: 4.3 G/DL (ref 3.5–5.2)
ALP SERPL-CCNC: 42 U/L (ref 55–135)
ALT SERPL W/O P-5'-P-CCNC: 42 U/L (ref 10–44)
ANION GAP SERPL CALC-SCNC: 7 MMOL/L (ref 8–16)
AST SERPL-CCNC: 14 U/L (ref 10–40)
BILIRUB SERPL-MCNC: 1.3 MG/DL (ref 0.1–1)
BUN SERPL-MCNC: 13 MG/DL (ref 6–20)
CALCIUM SERPL-MCNC: 9.5 MG/DL (ref 8.7–10.5)
CHLORIDE SERPL-SCNC: 107 MMOL/L (ref 95–110)
CHOLEST SERPL-MCNC: 168 MG/DL (ref 120–199)
CHOLEST/HDLC SERPL: 4.7 {RATIO} (ref 2–5)
CO2 SERPL-SCNC: 25 MMOL/L (ref 23–29)
COMPLEXED PSA SERPL-MCNC: 0.59 NG/ML (ref 0–4)
CREAT SERPL-MCNC: 0.9 MG/DL (ref 0.5–1.4)
CV STRESS BASE HR: 82 BPM
DIASTOLIC BLOOD PRESSURE: 80 MMHG
ERYTHROCYTE [DISTWIDTH] IN BLOOD BY AUTOMATED COUNT: 12.9 % (ref 11.5–14.5)
EST. GFR  (NO RACE VARIABLE): >60 ML/MIN/1.73 M^2
ESTIMATED AVG GLUCOSE: 103 MG/DL (ref 68–131)
GLUCOSE SERPL-MCNC: 89 MG/DL (ref 70–110)
HBA1C MFR BLD: 5.2 % (ref 4–5.6)
HCT VFR BLD AUTO: 43.2 % (ref 40–54)
HDLC SERPL-MCNC: 36 MG/DL (ref 40–75)
HDLC SERPL: 21.4 % (ref 20–50)
HGB BLD-MCNC: 14.5 G/DL (ref 14–18)
LDLC SERPL CALC-MCNC: 109.6 MG/DL (ref 63–159)
MCH RBC QN AUTO: 28.2 PG (ref 27–31)
MCHC RBC AUTO-ENTMCNC: 33.6 G/DL (ref 32–36)
MCV RBC AUTO: 84 FL (ref 82–98)
NONHDLC SERPL-MCNC: 132 MG/DL
OHS CV CPX 85 PERCENT MAX PREDICTED HEART RATE MALE: 152
OHS CV CPX MAX PREDICTED HEART RATE: 179
OHS CV CPX PATIENT IS FEMALE: 0
OHS CV CPX PATIENT IS MALE: 1
OHS CV CPX PEAK DIASTOLIC BLOOD PRESSURE: 74 MMHG
OHS CV CPX PEAK HEAR RATE: 155 BPM
OHS CV CPX PEAK RATE PRESSURE PRODUCT: NORMAL
OHS CV CPX PEAK SYSTOLIC BLOOD PRESSURE: 164 MMHG
OHS CV CPX PERCENT MAX PREDICTED HEART RATE ACHIEVED: 87
OHS CV CPX RATE PRESSURE PRODUCT PRESENTING: 9758
PLATELET # BLD AUTO: 208 K/UL (ref 150–450)
PMV BLD AUTO: 9.9 FL (ref 9.2–12.9)
POTASSIUM SERPL-SCNC: 4.1 MMOL/L (ref 3.5–5.1)
PROT SERPL-MCNC: 7.2 G/DL (ref 6–8.4)
RBC # BLD AUTO: 5.15 M/UL (ref 4.6–6.2)
SODIUM SERPL-SCNC: 139 MMOL/L (ref 136–145)
STRESS ECHO POST EXERCISE DUR MIN: 10 MINUTES
STRESS ECHO POST EXERCISE DUR SEC: 10 SECONDS
SYSTOLIC BLOOD PRESSURE: 119 MMHG
TRIGL SERPL-MCNC: 112 MG/DL (ref 30–150)
TSH SERPL DL<=0.005 MIU/L-ACNC: 1.3 UIU/ML (ref 0.4–4)
WBC # BLD AUTO: 4.87 K/UL (ref 3.9–12.7)

## 2023-09-01 PROCEDURE — 85027 COMPLETE CBC AUTOMATED: CPT | Performed by: INTERNAL MEDICINE

## 2023-09-01 PROCEDURE — 84153 ASSAY OF PSA TOTAL: CPT | Performed by: INTERNAL MEDICINE

## 2023-09-01 PROCEDURE — 3074F SYST BP LT 130 MM HG: CPT | Mod: CPTII,S$GLB,, | Performed by: INTERNAL MEDICINE

## 2023-09-01 PROCEDURE — 1159F PR MEDICATION LIST DOCUMENTED IN MEDICAL RECORD: ICD-10-PCS | Mod: CPTII,S$GLB,, | Performed by: INTERNAL MEDICINE

## 2023-09-01 PROCEDURE — 93016 CV STRESS TEST SUPVJ ONLY: CPT | Mod: ,,, | Performed by: INTERNAL MEDICINE

## 2023-09-01 PROCEDURE — 97802 PR MED NUTR THER, 1ST, INDIV, EA 15 MIN: ICD-10-PCS | Mod: S$GLB,,, | Performed by: DIETITIAN, REGISTERED

## 2023-09-01 PROCEDURE — 84443 ASSAY THYROID STIM HORMONE: CPT | Performed by: INTERNAL MEDICINE

## 2023-09-01 PROCEDURE — 3008F PR BODY MASS INDEX (BMI) DOCUMENTED: ICD-10-PCS | Mod: CPTII,S$GLB,, | Performed by: INTERNAL MEDICINE

## 2023-09-01 PROCEDURE — 99999 PR PBB SHADOW E&M-EST. PATIENT-LVL III: ICD-10-PCS | Mod: PBBFAC,,, | Performed by: INTERNAL MEDICINE

## 2023-09-01 PROCEDURE — 93018 CV STRESS TEST I&R ONLY: CPT | Mod: ,,, | Performed by: INTERNAL MEDICINE

## 2023-09-01 PROCEDURE — 97750 PHYSICAL PERFORMANCE TEST: CPT | Performed by: PHYSICAL THERAPIST

## 2023-09-01 PROCEDURE — 3074F PR MOST RECENT SYSTOLIC BLOOD PRESSURE < 130 MM HG: ICD-10-PCS | Mod: CPTII,S$GLB,, | Performed by: INTERNAL MEDICINE

## 2023-09-01 PROCEDURE — 93016 EXERCISE STRESS - EKG (CUPID ONLY): ICD-10-PCS | Mod: ,,, | Performed by: INTERNAL MEDICINE

## 2023-09-01 PROCEDURE — 3078F PR MOST RECENT DIASTOLIC BLOOD PRESSURE < 80 MM HG: ICD-10-PCS | Mod: CPTII,S$GLB,, | Performed by: INTERNAL MEDICINE

## 2023-09-01 PROCEDURE — 99396 PR PREVENTIVE VISIT,EST,40-64: ICD-10-PCS | Mod: S$GLB,,, | Performed by: INTERNAL MEDICINE

## 2023-09-01 PROCEDURE — 1159F MED LIST DOCD IN RCRD: CPT | Mod: CPTII,S$GLB,, | Performed by: INTERNAL MEDICINE

## 2023-09-01 PROCEDURE — 99999 PR PBB SHADOW E&M-EST. PATIENT-LVL III: CPT | Mod: PBBFAC,,, | Performed by: INTERNAL MEDICINE

## 2023-09-01 PROCEDURE — 80061 LIPID PANEL: CPT | Performed by: INTERNAL MEDICINE

## 2023-09-01 PROCEDURE — 3008F BODY MASS INDEX DOCD: CPT | Mod: CPTII,S$GLB,, | Performed by: INTERNAL MEDICINE

## 2023-09-01 PROCEDURE — 83036 HEMOGLOBIN GLYCOSYLATED A1C: CPT | Performed by: INTERNAL MEDICINE

## 2023-09-01 PROCEDURE — 3078F DIAST BP <80 MM HG: CPT | Mod: CPTII,S$GLB,, | Performed by: INTERNAL MEDICINE

## 2023-09-01 PROCEDURE — 93018 EXERCISE STRESS - EKG (CUPID ONLY): ICD-10-PCS | Mod: ,,, | Performed by: INTERNAL MEDICINE

## 2023-09-01 PROCEDURE — 97802 MEDICAL NUTRITION INDIV IN: CPT | Mod: S$GLB,,, | Performed by: DIETITIAN, REGISTERED

## 2023-09-01 PROCEDURE — 99396 PREV VISIT EST AGE 40-64: CPT | Mod: S$GLB,,, | Performed by: INTERNAL MEDICINE

## 2023-09-01 PROCEDURE — 80053 COMPREHEN METABOLIC PANEL: CPT | Performed by: INTERNAL MEDICINE

## 2023-09-01 PROCEDURE — 93017 CV STRESS TEST TRACING ONLY: CPT

## 2023-09-01 NOTE — PROGRESS NOTES
"Subjective:      Patient ID: Theron Manrique is a 43 y.o. male.    Chief Complaint: Executive Health    HPI    On 10/27/22, it was a pleasure to see you  and perform your Executive Health evaluation. At the time of this visit, you are 43 years old.      YOUR PAST MEDICAL HISTORY includes ADHD.    Your primary care doctor is Dr. Joe.      YOUR PAST SURGICAL HISTORY includes a tonsillectomy, wisdom teeth extraction, vasectomy, and excision of a benign skin lesion.     YOUR CURRENT MEDICATIONS include Dexedrine Spansules 10 mg twice a day.     You do not have ANY KNOWN DRUG ALLERGIES.     YOUR SOCIAL HISTORY includes employment by [employer].  You are  with one child.   You drink alcohol infrequently.  You denied any tobacco or illicit drug use.       YOUR FAMILY HISTORY includes your father, alive, with hearing loss and prostate cancer diagnosed in his 70s. Your mother, alive, with no known medical problems. Multiple family members have psoriasis. Your paternal grandfather possibly had colon and/or prostate cancer.     YOUR ROUTINE HEALTH MAINTENANCE includes a tetanus booster November 2018, declined flu vaccine.     Review of Systems   Constitutional:  Negative for chills and fever.   HENT:  Negative for ear pain and sore throat.    Respiratory:  Negative for cough.    Cardiovascular:  Negative for chest pain.   Gastrointestinal:  Negative for abdominal pain and blood in stool.   Genitourinary:  Negative for dysuria and hematuria.   Neurological:  Negative for seizures and syncope.     Objective:   /65   Pulse 71   Temp 97.6 °F (36.4 °C)   Ht 6' 0.99" (1.854 m)   Wt 111.6 kg (246 lb)   BMI 32.46 kg/m²     Physical Exam  Constitutional:       General: He is not in acute distress.     Appearance: He is well-developed.   HENT:      Head: Normocephalic and atraumatic.   Eyes:      Extraocular Movements: Extraocular movements intact.   Neck:      Thyroid: No thyromegaly.   Cardiovascular:      Rate " and Rhythm: Normal rate and regular rhythm.   Pulmonary:      Breath sounds: Normal breath sounds. No wheezing or rales.   Abdominal:      General: Bowel sounds are normal.      Palpations: Abdomen is soft.      Tenderness: There is no abdominal tenderness.   Musculoskeletal:         General: No swelling.      Cervical back: Neck supple. No rigidity.   Lymphadenopathy:      Cervical: No cervical adenopathy.   Skin:     General: Skin is warm and dry.   Neurological:      Mental Status: He is alert and oriented to person, place, and time.   Psychiatric:         Behavior: Behavior normal.         Lab Results   Component Value Date    WBC 4.87 09/01/2023    HGB 14.5 09/01/2023    HGB 15.0 10/27/2022    HGB 14.9 11/22/2021    HCT 43.2 09/01/2023    MCV 84 09/01/2023    MCV 84 10/27/2022    MCV 83 11/22/2021     09/01/2023    CHOL 168 09/01/2023    TRIG 112 09/01/2023    HDL 36 (L) 09/01/2023    LDLCALC 109.6 09/01/2023    LDLCALC 97.2 10/27/2022    LDLCALC 98.6 11/22/2021    ALT 42 09/01/2023    AST 14 09/01/2023     09/01/2023    K 4.1 09/01/2023    CALCIUM 9.5 09/01/2023     09/01/2023    CO2 25 09/01/2023    BUN 13 09/01/2023    CREATININE 0.9 09/01/2023    CREATININE 0.9 10/27/2022    CREATININE 0.8 11/22/2021    EGFRNORACEVR >60 09/01/2023    EGFRNORACEVR >60 10/27/2022    TSH 1.271 10/27/2022    TSH 1.769 11/22/2021    PSA 0.56 10/27/2022    GLU 89 09/01/2023    HGBA1C 5.3 10/27/2022    HGBA1C 5.4 11/22/2021          The 10-year ASCVD risk score (Dora SOLANO, et al., 2019) is: 3.4%    Values used to calculate the score:      Age: 43 years      Sex: Male      Is Non- : No      Diabetic: No      Tobacco smoker: Yes      Systolic Blood Pressure: 109 mmHg      Is BP treated: No      HDL Cholesterol: 36 mg/dL      Total Cholesterol: 168 mg/dL     Assessment:     1. Routine general medical examination at a health care facility    2. Attention deficit disorder (ADD) without  hyperactivity      Plan:   1. Routine general medical examination at a health care facility    2. Attention deficit disorder (ADD) without hyperactivity      Heart healthy diet, regular exercise, and regular use of sunscreen.   HM reviewed  See executive Health letter for details.    There are no Patient Instructions on file for this visit.    Future Appointments   Date Time Provider Department Center   10/3/2023  1:40 PM Pete Joe MD [facility]       Lab Frequency Next Occurrence   Ambulatory referral/consult to Dermatology Once 11/03/2022       No follow-ups on file.

## 2023-09-02 NOTE — PROGRESS NOTES
:  1980    DX: Z00.0  Orders:  Fitness Evaluation    An [employer] Component evaluation was completed.  Results are as follows:    Height (in):    71                            Weight (lbs):    246                                    BMI:   34.38    Resting Energy Expenditure:         2047       kcal/24 hrs.                             Body Composition:          23.31  % body fat             Rating: Good     Waist to Hip Ratio:     0.93                    Risk:  Low Risk    Hip taken over clothing:      Yes          Muscular Strength and Endurance Assessment:               Strength (lbs):     Right: 132             Rating:  Average     Left:  133.8           Rating: Above Average    Push-ups:   25                 Rating:  Excellent    Curl-ups :   32                Rating:  Below Average     Flexibility Testing:   Sit and Reach (cm):    30.5                       Rating:  Very Good     Assessment: Patient is employed with [employer] working day shift.  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  He does weight lifting 4-5 times a week and does not eat fast food, prepares all meals.     Date 2023   Height (in): 71   Weight: 246   BMI: 34.38   Body Fat %: 23.31   Waist (cm): 105   Hip (cm): 113   WHR: 0.93   RBP: 109/65   RHR: 71    Rt (lbs): 132    Lt (lbs): 134   Push-up  25   Curl-up  32   Flexibility  31   REE  (Kcals)    Final Chart input copied from results page on Excel    The patient completed the testing procedures without complications.

## 2023-10-05 ENCOUNTER — OFFICE VISIT (OUTPATIENT)
Dept: INTERNAL MEDICINE | Facility: CLINIC | Age: 43
End: 2023-10-05
Payer: COMMERCIAL

## 2023-10-05 DIAGNOSIS — F98.8 ATTENTION DEFICIT DISORDER (ADD) WITHOUT HYPERACTIVITY: ICD-10-CM

## 2023-10-05 PROCEDURE — 1160F PR REVIEW ALL MEDS BY PRESCRIBER/CLIN PHARMACIST DOCUMENTED: ICD-10-PCS | Mod: CPTII,95,, | Performed by: FAMILY MEDICINE

## 2023-10-05 PROCEDURE — 99213 OFFICE O/P EST LOW 20 MIN: CPT | Mod: 95,,, | Performed by: FAMILY MEDICINE

## 2023-10-05 PROCEDURE — 99213 PR OFFICE/OUTPT VISIT, EST, LEVL III, 20-29 MIN: ICD-10-PCS | Mod: 95,,, | Performed by: FAMILY MEDICINE

## 2023-10-05 PROCEDURE — 1159F PR MEDICATION LIST DOCUMENTED IN MEDICAL RECORD: ICD-10-PCS | Mod: CPTII,95,, | Performed by: FAMILY MEDICINE

## 2023-10-05 PROCEDURE — 1160F RVW MEDS BY RX/DR IN RCRD: CPT | Mod: CPTII,95,, | Performed by: FAMILY MEDICINE

## 2023-10-05 PROCEDURE — 1159F MED LIST DOCD IN RCRD: CPT | Mod: CPTII,95,, | Performed by: FAMILY MEDICINE

## 2023-10-05 PROCEDURE — 3044F PR MOST RECENT HEMOGLOBIN A1C LEVEL <7.0%: ICD-10-PCS | Mod: CPTII,95,, | Performed by: FAMILY MEDICINE

## 2023-10-05 PROCEDURE — 3044F HG A1C LEVEL LT 7.0%: CPT | Mod: CPTII,95,, | Performed by: FAMILY MEDICINE

## 2023-10-05 RX ORDER — DEXTROAMPHETAMINE SULFATE 10 MG/1
10 CAPSULE, EXTENDED RELEASE ORAL 2 TIMES DAILY
Qty: 60 CAPSULE | Refills: 0 | Status: SHIPPED | OUTPATIENT
Start: 2023-10-05 | End: 2023-10-31 | Stop reason: SDUPTHER

## 2023-10-05 NOTE — PROGRESS NOTES
It was in error and rerouted to Dr Sarah Malagon  I apologize  Subjective:   Patient ID: Theron Manrique is a 43 y.o. male.  Chief Complaint:  No chief complaint on file.    The patient location is: Work  The chief complaint leading to consultation is: ADHD Follow Up    Visit type: audiovisual    Face to Face time with patient: 10 minutes  15 minutes of total time spent on the encounter, which includes face to face time and non-face to face time preparing to see the patient (eg, review of tests), Obtaining and/or reviewing separately obtained history, Documenting clinical information in the electronic or other health record, Independently interpreting results (not separately reported) and communicating results to the patient/family/caregiver, or Care coordination (not separately reported).     Each patient to whom he or she provides medical services by telemedicine is:  (1) informed of the relationship between the physician and patient and the respective role of any other health care provider with respect to management of the patient; and (2) notified that he or she may decline to receive medical services by telemedicine and may withdraw from such care at any time.    ADHD follow-up  Patient had executive health evaluation September 2023 with stable blood work  On Dexedrine Spansules 10 mg by mouth twice a day  Effective with symptoms controlled on present medication and dose helping compensate for deficits at work/school.  Duration is appropriate.    No mood instability, tics, or disordered sleep, or other apparent adverse effects.    Tolerating current treatment well.   No behaviors to suggest inappropriate use of prescribed medications.  [place] Board of Pharmacy Controlled Prescription Drug Monitoring database was queried and showed no activity to suggest abuse, diversion, or other inappropriate use of prescription medications.     No new complaints or concerns today    Review of Systems   Constitutional:  Negative for activity change, appetite change, fatigue and  unexpected weight change.   HENT:  Negative for hearing loss, rhinorrhea and trouble swallowing.    Eyes:  Negative for discharge and visual disturbance.   Respiratory:  Negative for chest tightness, shortness of breath and wheezing.    Cardiovascular:  Negative for chest pain, palpitations and leg swelling.   Gastrointestinal:  Negative for abdominal pain, blood in stool, constipation, diarrhea, nausea and vomiting.   Endocrine: Negative for polydipsia and polyuria.   Genitourinary:  Negative for difficulty urinating, hematuria and urgency.   Musculoskeletal:  Negative for arthralgias, joint swelling and neck pain.   Neurological:  Negative for weakness and headaches.   Psychiatric/Behavioral:  Negative for agitation, behavioral problems, confusion, decreased concentration, dysphoric mood, hallucinations and sleep disturbance. The patient is not nervous/anxious and is not hyperactive.    \  Objective:     Physical Exam  Constitutional:       Appearance: Normal appearance.   Psychiatric:         Mood and Affect: Mood and affect normal.         Behavior: Behavior normal.         Thought Content: Thought content normal.         Judgment: Judgment normal.       Assessment:       ICD-10-CM ICD-9-CM   1. Attention deficit disorder (ADD) without hyperactivity  F98.8 314.00     Plan:   Attention deficit disorder (ADD) without hyperactivity  -     dextroamphetamine sulfate (DEXEDRINE SPANSULE) 10 MG 24 hr capsule; Take 1 capsule (10 mg total) by mouth 2 (two) times a day.  Dispense: 60 capsule; Refill: 0    ADHD, stable, no side effects, controlled on present medication  Continue stimulant at present dose  OK to refill monthly x 2  RTC in 3 months  Telemedicine visit appropriate if patient prefers

## 2023-10-31 DIAGNOSIS — F98.8 ATTENTION DEFICIT DISORDER (ADD) WITHOUT HYPERACTIVITY: ICD-10-CM

## 2023-10-31 NOTE — TELEPHONE ENCOUNTER
No care due was identified.  [facility] Embedded Care Due Messages. Reference number: [id number].   10/31/2023 5:05:59 PM CDT

## 2023-11-01 RX ORDER — DEXTROAMPHETAMINE SULFATE 10 MG/1
10 CAPSULE, EXTENDED RELEASE ORAL 2 TIMES DAILY
Qty: 60 CAPSULE | Refills: 0 | Status: SHIPPED | OUTPATIENT
Start: 2023-11-01 | End: 2023-11-28 | Stop reason: SDUPTHER

## 2023-11-28 DIAGNOSIS — F98.8 ATTENTION DEFICIT DISORDER (ADD) WITHOUT HYPERACTIVITY: ICD-10-CM

## 2023-11-28 RX ORDER — DEXTROAMPHETAMINE SULFATE 10 MG/1
10 CAPSULE, EXTENDED RELEASE ORAL 2 TIMES DAILY
Qty: 60 CAPSULE | Refills: 0 | Status: SHIPPED | OUTPATIENT
Start: 2023-11-28 | End: 2024-01-02 | Stop reason: SDUPTHER

## 2023-11-28 NOTE — TELEPHONE ENCOUNTER
No care due was identified.  Health [facility] Embedded Care Due Messages. Reference number: [id number].   11/28/2023 7:32:18 AM CST

## 2024-01-02 ENCOUNTER — OFFICE VISIT (OUTPATIENT)
Dept: INTERNAL MEDICINE | Facility: CLINIC | Age: 44
End: 2024-01-02
Payer: COMMERCIAL

## 2024-01-02 DIAGNOSIS — F98.8 ATTENTION DEFICIT DISORDER (ADD) WITHOUT HYPERACTIVITY: Primary | ICD-10-CM

## 2024-01-02 PROCEDURE — 1160F RVW MEDS BY RX/DR IN RCRD: CPT | Mod: CPTII,95,, | Performed by: FAMILY MEDICINE

## 2024-01-02 PROCEDURE — 1159F MED LIST DOCD IN RCRD: CPT | Mod: CPTII,95,, | Performed by: FAMILY MEDICINE

## 2024-01-02 PROCEDURE — 99213 OFFICE O/P EST LOW 20 MIN: CPT | Mod: 95,,, | Performed by: FAMILY MEDICINE

## 2024-01-02 RX ORDER — DEXTROAMPHETAMINE SULFATE 10 MG/1
10 CAPSULE, EXTENDED RELEASE ORAL 2 TIMES DAILY
Qty: 60 CAPSULE | Refills: 0 | Status: SHIPPED | OUTPATIENT
Start: 2024-01-30

## 2024-01-02 RX ORDER — DEXTROAMPHETAMINE SULFATE 10 MG/1
10 CAPSULE, EXTENDED RELEASE ORAL 2 TIMES DAILY
Qty: 60 CAPSULE | Refills: 0 | Status: SHIPPED | OUTPATIENT
Start: 2024-01-02

## 2024-01-02 RX ORDER — DEXTROAMPHETAMINE SULFATE 10 MG/1
10 CAPSULE, EXTENDED RELEASE ORAL 2 TIMES DAILY
Qty: 60 CAPSULE | Refills: 0 | Status: SHIPPED | OUTPATIENT
Start: 2024-02-27

## 2024-01-02 NOTE — PROGRESS NOTES
Subjective:   Patient ID: Theron Manrique is a 43 y.o. male.  Chief Complaint:  ADHD    The patient location is: Work  The chief complaint leading to consultation is: ADHD Follow Up     Visit type: audiovisual     Face to Face time with patient: 10 minutes  15 minutes of total time spent on the encounter, which includes face to face time and non-face to face time preparing to see the patient (eg, review of tests), Obtaining and/or reviewing separately obtained history, Documenting clinical information in the electronic or other health record, Independently interpreting results (not separately reported) and communicating results to the patient/family/caregiver, or Care coordination (not separately reported).      Each patient to whom he or she provides medical services by telemedicine is:  (1) informed of the relationship between the physician and patient and the respective role of any other health care provider with respect to management of the patient; and (2) notified that he or she may decline to receive medical services by telemedicine and may withdraw from such care at any time.     ADHD follow-up  Last visit October 2023 telemedicine for ADHD follow-up   Last annual physical exam/executive health evaluation September 2023     On Dexedrine Spansules 10 mg by mouth twice a day  Effective with symptoms controlled on present medication and dose helping compensate for deficits at work/school.  Duration is appropriate.    No mood instability, tics, or disordered sleep, or other apparent adverse effects.    Tolerating current treatment well.   No behaviors to suggest inappropriate use of prescribed medications.  [place] [employer] Controlled Prescription Drug Monitoring database was queried and showed no activity to suggest abuse, diversion, or other inappropriate use of prescription medications.      No new complaints or concerns today      Review of Systems   Constitutional:  Negative for activity  change, appetite change, fatigue and unexpected weight change.   HENT:  Negative for hearing loss, rhinorrhea and trouble swallowing.    Eyes:  Negative for discharge and visual disturbance.   Respiratory:  Negative for chest tightness, shortness of breath and wheezing.    Cardiovascular:  Negative for chest pain, palpitations and leg swelling.   Gastrointestinal:  Negative for abdominal pain, blood in stool, constipation, diarrhea, nausea and vomiting.   Endocrine: Negative for polydipsia and polyuria.   Genitourinary:  Negative for difficulty urinating, hematuria and urgency.   Musculoskeletal:  Negative for arthralgias, joint swelling and neck pain.   Neurological:  Negative for weakness and headaches.   Psychiatric/Behavioral:  Negative for agitation, behavioral problems, confusion, decreased concentration, dysphoric mood, hallucinations and sleep disturbance. The patient is not nervous/anxious and is not hyperactive.      Objective:       Physical Exam  Constitutional:       Appearance: Normal appearance.   Psychiatric:         Mood and Affect: Mood and affect normal.         Behavior: Behavior normal.         Thought Content: Thought content normal.       Assessment:       ICD-10-CM ICD-9-CM   1. Attention deficit disorder (ADD) without hyperactivity  F98.8 314.00     Plan:   Attention deficit disorder (ADD) without hyperactivity  -     dextroamphetamine sulfate (DEXEDRINE SPANSULE) 10 MG 24 hr capsule; Take 1 capsule (10 mg total) by mouth 2 (two) times a day.  Dispense: 60 capsule; Refill: 0  -     dextroamphetamine sulfate (DEXEDRINE SPANSULE) 10 MG 24 hr capsule; Take 1 capsule (10 mg total) by mouth 2 (two) times a day.  Dispense: 60 capsule; Refill: 0  -     dextroamphetamine sulfate (DEXEDRINE SPANSULE) 10 MG 24 hr capsule; Take 1 capsule (10 mg total) by mouth 2 (two) times a day.  Dispense: 60 capsule; Refill: 0    ADHD stable, no side effects, symptoms well controlled on current medication  Continue  stimulant at present dose  RTC in 3 months  Telemedicine visit appropriate if patient prefers

## 2024-04-04 ENCOUNTER — OFFICE VISIT (OUTPATIENT)
Dept: INTERNAL MEDICINE | Facility: CLINIC | Age: 44
End: 2024-04-04
Payer: COMMERCIAL

## 2024-04-04 DIAGNOSIS — F98.8 ATTENTION DEFICIT DISORDER (ADD) WITHOUT HYPERACTIVITY: ICD-10-CM

## 2024-04-04 PROCEDURE — 99213 OFFICE O/P EST LOW 20 MIN: CPT | Mod: 95,,, | Performed by: FAMILY MEDICINE

## 2024-04-04 PROCEDURE — 1160F RVW MEDS BY RX/DR IN RCRD: CPT | Mod: CPTII,95,, | Performed by: FAMILY MEDICINE

## 2024-04-04 PROCEDURE — 1159F MED LIST DOCD IN RCRD: CPT | Mod: CPTII,95,, | Performed by: FAMILY MEDICINE

## 2024-04-04 RX ORDER — DEXTROAMPHETAMINE SULFATE 10 MG/1
10 CAPSULE, EXTENDED RELEASE ORAL 2 TIMES DAILY
Qty: 60 CAPSULE | Refills: 0 | Status: SHIPPED | OUTPATIENT
Start: 2024-04-04 | End: 2024-06-06

## 2024-04-04 RX ORDER — DEXTROAMPHETAMINE SULFATE 10 MG/1
10 CAPSULE, EXTENDED RELEASE ORAL 2 TIMES DAILY
Qty: 60 CAPSULE | Refills: 0 | Status: SHIPPED | OUTPATIENT
Start: 2024-05-02 | End: 2024-06-06

## 2024-04-04 RX ORDER — DEXTROAMPHETAMINE SULFATE 10 MG/1
10 CAPSULE, EXTENDED RELEASE ORAL 2 TIMES DAILY
Qty: 60 CAPSULE | Refills: 0 | Status: SHIPPED | OUTPATIENT
Start: 2024-05-30 | End: 2024-06-06 | Stop reason: SDUPTHER

## 2024-04-04 NOTE — PROGRESS NOTES
Subjective:   Patient ID: Theron Manrique is a 44 y.o. male.  Chief Complaint:  ADHD    The patient location is: Work  The chief complaint leading to consultation is: ADHD Follow Up     Visit type: audiovisual     Face to Face time with patient: 10 minutes  15 minutes of total time spent on the encounter, which includes face to face time and non-face to face time preparing to see the patient (eg, review of tests), Obtaining and/or reviewing separately obtained history, Documenting clinical information in the electronic or other health record, Independently interpreting results (not separately reported) and communicating results to the patient/family/caregiver, or Care coordination (not separately reported).      Each patient to whom he or she provides medical services by telemedicine is:  (1) informed of the relationship between the physician and patient and the respective role of any other health care provider with respect to management of the patient; and (2) notified that he or she may decline to receive medical services by telemedicine and may withdraw from such care at any time.     ADHD follow-up  Last visit January 2024 telemedicine for ADHD follow-up   Last annual physical exam/executive health evaluation September 2023   On Dexedrine Spansules 10 mg by mouth twice a day  Effective with symptoms controlled on present medication and dose helping compensate for deficits at work/school.  Duration is appropriate.    No mood instability, tics, or disordered sleep, or other apparent adverse effects.    Tolerating current treatment well.   No behaviors to suggest inappropriate use of prescribed medications.  [place] [employer] Controlled Prescription Drug Monitoring database was queried and showed no activity to suggest abuse, diversion, or other inappropriate use of prescription medications.      No new complaints or concerns today    Review of Systems   Constitutional:  Negative for activity change,  appetite change, fatigue and unexpected weight change.   HENT:  Negative for hearing loss, rhinorrhea and trouble swallowing.    Eyes:  Negative for discharge and visual disturbance.   Respiratory:  Negative for chest tightness, shortness of breath and wheezing.    Cardiovascular:  Negative for chest pain, palpitations and leg swelling.   Gastrointestinal:  Negative for abdominal pain, blood in stool, constipation, diarrhea, nausea and vomiting.   Endocrine: Negative for polydipsia and polyuria.   Genitourinary:  Negative for difficulty urinating, hematuria and urgency.   Musculoskeletal:  Negative for arthralgias, joint swelling and neck pain.   Neurological:  Negative for weakness and headaches.   Psychiatric/Behavioral:  Negative for agitation, behavioral problems, confusion, decreased concentration, dysphoric mood, hallucinations and sleep disturbance. The patient is not nervous/anxious and is not hyperactive.        Objective:     Physical Exam  Constitutional:       Appearance: Normal appearance.   Psychiatric:         Mood and Affect: Mood and affect normal.         Behavior: Behavior normal.         Thought Content: Thought content normal.         Judgment: Judgment normal.         Assessment:       ICD-10-CM ICD-9-CM   1. Attention deficit disorder (ADD) without hyperactivity  F98.8 314.00     Plan:   Attention deficit disorder (ADD) without hyperactivity  -     dextroamphetamine sulfate (DEXEDRINE SPANSULE) 10 MG 24 hr capsule; Take 1 capsule (10 mg total) by mouth 2 (two) times a day.  Dispense: 60 capsule; Refill: 0  -     dextroamphetamine sulfate (DEXEDRINE SPANSULE) 10 MG 24 hr capsule; Take 1 capsule (10 mg total) by mouth 2 (two) times a day.  Dispense: 60 capsule; Refill: 0  -     dextroamphetamine sulfate (DEXEDRINE SPANSULE) 10 MG 24 hr capsule; Take 1 capsule (10 mg total) by mouth 2 (two) times a day.  Dispense: 60 capsule; Refill: 0    ADHD stable, no side effects, symptoms well controlled on  current medication  Continue stimulant at present dose  RTC in 3 months  Telemedicine visit appropriate if patient prefers

## 2024-06-06 ENCOUNTER — OFFICE VISIT (OUTPATIENT)
Dept: INTERNAL MEDICINE | Facility: CLINIC | Age: 44
End: 2024-06-06
Payer: COMMERCIAL

## 2024-06-06 VITALS
DIASTOLIC BLOOD PRESSURE: 88 MMHG | BODY MASS INDEX: 32.4 KG/M2 | HEART RATE: 106 BPM | TEMPERATURE: 99 F | OXYGEN SATURATION: 97 % | WEIGHT: 244.5 LBS | SYSTOLIC BLOOD PRESSURE: 130 MMHG | HEIGHT: 73 IN

## 2024-06-06 DIAGNOSIS — Z00.00 ROUTINE GENERAL MEDICAL EXAMINATION AT A HEALTH CARE FACILITY: Primary | ICD-10-CM

## 2024-06-06 DIAGNOSIS — F98.8 ATTENTION DEFICIT DISORDER (ADD) WITHOUT HYPERACTIVITY: Primary | ICD-10-CM

## 2024-06-06 PROCEDURE — 3079F DIAST BP 80-89 MM HG: CPT | Mod: CPTII,S$GLB,, | Performed by: FAMILY MEDICINE

## 2024-06-06 PROCEDURE — 99213 OFFICE O/P EST LOW 20 MIN: CPT | Mod: S$GLB,,, | Performed by: FAMILY MEDICINE

## 2024-06-06 PROCEDURE — 3008F BODY MASS INDEX DOCD: CPT | Mod: CPTII,S$GLB,, | Performed by: FAMILY MEDICINE

## 2024-06-06 PROCEDURE — 1160F RVW MEDS BY RX/DR IN RCRD: CPT | Mod: CPTII,S$GLB,, | Performed by: FAMILY MEDICINE

## 2024-06-06 PROCEDURE — 99999 PR PBB SHADOW E&M-EST. PATIENT-LVL III: CPT | Mod: PBBFAC,,, | Performed by: FAMILY MEDICINE

## 2024-06-06 PROCEDURE — 3075F SYST BP GE 130 - 139MM HG: CPT | Mod: CPTII,S$GLB,, | Performed by: FAMILY MEDICINE

## 2024-06-06 PROCEDURE — 1159F MED LIST DOCD IN RCRD: CPT | Mod: CPTII,S$GLB,, | Performed by: FAMILY MEDICINE

## 2024-06-06 RX ORDER — DEXTROAMPHETAMINE SULFATE 10 MG/1
10 CAPSULE, EXTENDED RELEASE ORAL 2 TIMES DAILY
Qty: 60 CAPSULE | Refills: 0 | Status: SHIPPED | OUTPATIENT
Start: 2024-06-06

## 2024-06-06 RX ORDER — DEXTROAMPHETAMINE SULFATE 10 MG/1
10 CAPSULE, EXTENDED RELEASE ORAL 2 TIMES DAILY
Qty: 60 CAPSULE | Refills: 0 | Status: SHIPPED | OUTPATIENT
Start: 2024-07-04

## 2024-06-06 RX ORDER — DEXTROAMPHETAMINE SULFATE 10 MG/1
10 CAPSULE, EXTENDED RELEASE ORAL 2 TIMES DAILY
Qty: 60 CAPSULE | Refills: 0 | Status: SHIPPED | OUTPATIENT
Start: 2024-08-01

## 2024-06-06 NOTE — PROGRESS NOTES
"Subjective:   Patient ID: Theron Manrique is a 44 y.o. male.  Chief Complaint:  Medication Refill    ADHD follow-up    Last visit  April 2024 telemedicine for ADHD follow-up   Last annual physical exam/executive health evaluation September 2023   On Dexedrine Spansules 10 mg by mouth twice a day  Effective with symptoms controlled on present medication and dose helping compensate for deficits at work/school.  Duration is appropriate.    No mood instability, tics, or disordered sleep, or other apparent adverse effects.    Tolerating current treatment well.   No behaviors to suggest inappropriate use of prescribed medications.  [place] [employer]acy Controlled Prescription Drug Monitoring database was queried and showed no activity to suggest abuse, diversion, or other inappropriate use of prescription medications.      No new complaints or concerns today      Review of Systems   Constitutional:  Negative for activity change, appetite change, fatigue and unexpected weight change.   HENT:  Negative for hearing loss, rhinorrhea and trouble swallowing.    Eyes:  Negative for discharge.   Respiratory:  Negative for chest tightness, shortness of breath and wheezing.    Cardiovascular:  Negative for chest pain, palpitations and leg swelling.   Gastrointestinal:  Negative for abdominal pain, constipation, diarrhea, nausea and vomiting.   Genitourinary:  Negative for difficulty urinating and hematuria.   Neurological:  Negative for headaches.   Psychiatric/Behavioral:  Negative for agitation, behavioral problems, confusion, decreased concentration, dysphoric mood, hallucinations and sleep disturbance. The patient is not nervous/anxious and is not hyperactive.      Objective:   /88 (BP Location: Left arm, Patient Position: Sitting, BP Method: Large (Manual))   Pulse 106   Temp 98.8 °F (37.1 °C) (Tympanic)   Ht 6' 0.99" (1.854 m)   Wt 110.9 kg (244 lb 7.8 oz)   SpO2 97%   BMI 32.27 kg/m²     Physical " Exam  Vitals and nursing note reviewed.   Constitutional:       Appearance: He is well-developed and normal weight.   Cardiovascular:      Rate and Rhythm: Normal rate and regular rhythm.      Heart sounds: Normal heart sounds.   Pulmonary:      Effort: Pulmonary effort is normal.      Breath sounds: Normal breath sounds and air entry.   Abdominal:      General: There is no distension.      Palpations: Abdomen is soft.      Tenderness: There is no abdominal tenderness.   Neurological:      Mental Status: He is alert.   Psychiatric:         Attention and Perception: Attention normal. He is attentive.         Mood and Affect: Mood and affect normal.         Behavior: Behavior normal. Behavior is not hyperactive. Behavior is cooperative.       Assessment:       ICD-10-CM ICD-9-CM   1. Attention deficit disorder (ADD) without hyperactivity  F98.8 314.00     Plan:   Attention deficit disorder (ADD) without hyperactivity  -     dextroamphetamine sulfate (DEXEDRINE SPANSULE) 10 MG 24 hr capsule; Take 1 capsule (10 mg total) by mouth 2 (two) times a day.  Dispense: 60 capsule; Refill: 0  -     dextroamphetamine sulfate (DEXEDRINE SPANSULE) 10 MG 24 hr capsule; Take 1 capsule (10 mg total) by mouth 2 (two) times a day.  Dispense: 60 capsule; Refill: 0  -     dextroamphetamine sulfate (DEXEDRINE SPANSULE) 10 MG 24 hr capsule; Take 1 capsule (10 mg total) by mouth 2 (two) times a day.  Dispense: 60 capsule; Refill: 0    ADHD stable, no side effects, symptoms well controlled on current medication  Continue stimulant at present dose  RTC in 3 months for executive Health evaluation

## 2024-09-26 ENCOUNTER — CLINICAL SUPPORT (OUTPATIENT)
Dept: INTERNAL MEDICINE | Facility: CLINIC | Age: 44
End: 2024-09-26
Payer: COMMERCIAL

## 2024-09-26 ENCOUNTER — OFFICE VISIT (OUTPATIENT)
Dept: INTERNAL MEDICINE | Facility: CLINIC | Age: 44
End: 2024-09-26
Payer: COMMERCIAL

## 2024-09-26 ENCOUNTER — HOSPITAL ENCOUNTER (OUTPATIENT)
Dept: RADIOLOGY | Facility: HOSPITAL | Age: 44
Discharge: HOME | End: 2024-09-26
Attending: FAMILY MEDICINE
Payer: COMMERCIAL

## 2024-09-26 VITALS
TEMPERATURE: 97 F | DIASTOLIC BLOOD PRESSURE: 64 MMHG | WEIGHT: 244 LBS | SYSTOLIC BLOOD PRESSURE: 109 MMHG | BODY MASS INDEX: 32.34 KG/M2 | HEIGHT: 73 IN | HEART RATE: 73 BPM

## 2024-09-26 DIAGNOSIS — F98.8 ATTENTION DEFICIT DISORDER (ADD) WITHOUT HYPERACTIVITY: ICD-10-CM

## 2024-09-26 DIAGNOSIS — Z00.00 ROUTINE GENERAL MEDICAL EXAMINATION AT A HEALTH CARE FACILITY: Primary | ICD-10-CM

## 2024-09-26 DIAGNOSIS — R93.1 ELEVATED CORONARY ARTERY CALCIUM SCORE: ICD-10-CM

## 2024-09-26 DIAGNOSIS — Z00.00 ROUTINE GENERAL MEDICAL EXAMINATION AT A HEALTH CARE FACILITY: ICD-10-CM

## 2024-09-26 DIAGNOSIS — G89.29 CHRONIC RIGHT-SIDED LOW BACK PAIN WITH RIGHT-SIDED SCIATICA: ICD-10-CM

## 2024-09-26 DIAGNOSIS — M47.817 FACET ARTHRITIS OF LUMBOSACRAL REGION: ICD-10-CM

## 2024-09-26 DIAGNOSIS — M54.41 CHRONIC RIGHT-SIDED LOW BACK PAIN WITH RIGHT-SIDED SCIATICA: ICD-10-CM

## 2024-09-26 LAB
ALBUMIN SERPL BCP-MCNC: 4.3 G/DL (ref 3.5–5.2)
ALP SERPL-CCNC: 45 U/L (ref 55–135)
ALT SERPL W/O P-5'-P-CCNC: 39 U/L (ref 10–44)
ANION GAP SERPL CALC-SCNC: 11 MMOL/L (ref 8–16)
AST SERPL-CCNC: 14 U/L (ref 10–40)
BILIRUB SERPL-MCNC: 1.3 MG/DL (ref 0.1–1)
BUN SERPL-MCNC: 12 MG/DL (ref 6–20)
CALCIUM SERPL-MCNC: 9.7 MG/DL (ref 8.7–10.5)
CHLORIDE SERPL-SCNC: 105 MMOL/L (ref 95–110)
CHOLEST SERPL-MCNC: 161 MG/DL (ref 120–199)
CHOLEST/HDLC SERPL: 3.9 {RATIO} (ref 2–5)
CO2 SERPL-SCNC: 23 MMOL/L (ref 23–29)
COMPLEXED PSA SERPL-MCNC: 0.53 NG/ML (ref 0–4)
CREAT SERPL-MCNC: 0.9 MG/DL (ref 0.5–1.4)
ERYTHROCYTE [DISTWIDTH] IN BLOOD BY AUTOMATED COUNT: 12.8 % (ref 11.5–14.5)
EST. GFR  (NO RACE VARIABLE): >60 ML/MIN/1.73 M^2
ESTIMATED AVG GLUCOSE: 105 MG/DL (ref 68–131)
GLUCOSE SERPL-MCNC: 85 MG/DL (ref 70–110)
HBA1C MFR BLD: 5.3 % (ref 4–5.6)
HCT VFR BLD AUTO: 43.9 % (ref 40–54)
HDLC SERPL-MCNC: 41 MG/DL (ref 40–75)
HDLC SERPL: 25.5 % (ref 20–50)
HGB BLD-MCNC: 15.1 G/DL (ref 14–18)
LDLC SERPL CALC-MCNC: 95 MG/DL (ref 63–159)
MCH RBC QN AUTO: 29.2 PG (ref 27–31)
MCHC RBC AUTO-ENTMCNC: 34.4 G/DL (ref 32–36)
MCV RBC AUTO: 85 FL (ref 82–98)
NONHDLC SERPL-MCNC: 120 MG/DL
PLATELET # BLD AUTO: 227 K/UL (ref 150–450)
PMV BLD AUTO: 9.7 FL (ref 9.2–12.9)
POTASSIUM SERPL-SCNC: 4.1 MMOL/L (ref 3.5–5.1)
PROT SERPL-MCNC: 7.2 G/DL (ref 6–8.4)
RBC # BLD AUTO: 5.17 M/UL (ref 4.6–6.2)
SODIUM SERPL-SCNC: 139 MMOL/L (ref 136–145)
TRIGL SERPL-MCNC: 125 MG/DL (ref 30–150)
TSH SERPL DL<=0.005 MIU/L-ACNC: 1.39 UIU/ML (ref 0.4–4)
WBC # BLD AUTO: 5.63 K/UL (ref 3.9–12.7)

## 2024-09-26 PROCEDURE — 80053 COMPREHEN METABOLIC PANEL: CPT | Performed by: FAMILY MEDICINE

## 2024-09-26 PROCEDURE — 75571 CT HRT W/O DYE W/CA TEST: CPT | Mod: 26,,, | Performed by: STUDENT IN AN ORGANIZED HEALTH CARE EDUCATION/TRAINING PROGRAM

## 2024-09-26 PROCEDURE — 83036 HEMOGLOBIN GLYCOSYLATED A1C: CPT | Performed by: FAMILY MEDICINE

## 2024-09-26 PROCEDURE — 3078F DIAST BP <80 MM HG: CPT | Mod: CPTII,S$GLB,, | Performed by: FAMILY MEDICINE

## 2024-09-26 PROCEDURE — 3008F BODY MASS INDEX DOCD: CPT | Mod: CPTII,S$GLB,, | Performed by: FAMILY MEDICINE

## 2024-09-26 PROCEDURE — 99396 PREV VISIT EST AGE 40-64: CPT | Mod: S$GLB,,, | Performed by: FAMILY MEDICINE

## 2024-09-26 PROCEDURE — 99999 PR PBB SHADOW E&M-EST. PATIENT-LVL III: CPT | Mod: PBBFAC,,, | Performed by: FAMILY MEDICINE

## 2024-09-26 PROCEDURE — 3044F HG A1C LEVEL LT 7.0%: CPT | Mod: CPTII,S$GLB,, | Performed by: FAMILY MEDICINE

## 2024-09-26 PROCEDURE — 80061 LIPID PANEL: CPT | Performed by: FAMILY MEDICINE

## 2024-09-26 PROCEDURE — 84443 ASSAY THYROID STIM HORMONE: CPT | Performed by: FAMILY MEDICINE

## 2024-09-26 PROCEDURE — 84153 ASSAY OF PSA TOTAL: CPT | Performed by: FAMILY MEDICINE

## 2024-09-26 PROCEDURE — 1159F MED LIST DOCD IN RCRD: CPT | Mod: CPTII,S$GLB,, | Performed by: FAMILY MEDICINE

## 2024-09-26 PROCEDURE — 85027 COMPLETE CBC AUTOMATED: CPT | Performed by: FAMILY MEDICINE

## 2024-09-26 PROCEDURE — 3074F SYST BP LT 130 MM HG: CPT | Mod: CPTII,S$GLB,, | Performed by: FAMILY MEDICINE

## 2024-09-26 PROCEDURE — 75571 CT HRT W/O DYE W/CA TEST: CPT | Mod: TC

## 2024-09-26 PROCEDURE — 1160F RVW MEDS BY RX/DR IN RCRD: CPT | Mod: CPTII,S$GLB,, | Performed by: FAMILY MEDICINE

## 2024-09-26 RX ORDER — METHYLPREDNISOLONE 4 MG/1
TABLET ORAL
Qty: 21 EACH | Refills: 0 | Status: SHIPPED | OUTPATIENT
Start: 2024-09-26

## 2024-09-26 RX ORDER — DEXTROAMPHETAMINE SULFATE 10 MG/1
10 CAPSULE, EXTENDED RELEASE ORAL 2 TIMES DAILY
Qty: 60 CAPSULE | Refills: 0 | Status: SHIPPED | OUTPATIENT
Start: 2024-09-26

## 2024-09-26 RX ORDER — DEXTROAMPHETAMINE SULFATE 10 MG/1
10 CAPSULE, EXTENDED RELEASE ORAL 2 TIMES DAILY
Qty: 60 CAPSULE | Refills: 0 | Status: SHIPPED | OUTPATIENT
Start: 2024-11-21

## 2024-09-26 RX ORDER — DEXTROAMPHETAMINE SULFATE 10 MG/1
10 CAPSULE, EXTENDED RELEASE ORAL 2 TIMES DAILY
Qty: 60 CAPSULE | Refills: 0 | Status: SHIPPED | OUTPATIENT
Start: 2024-10-24

## 2024-09-26 NOTE — PROGRESS NOTES
Subjective:   Patient ID: Theron Manrique is a 44 y.o. male.  Chief Complaint:  Executive Health    Presents for executive health evaluation  Past medical history for ADHD and lumbosacral facet arthritis  Past surgical history for tonsillectomy, wisdom teeth extraction, and mole excision  Current medications dex during Spansules 10 mg twice a day  No known allergies  Social history employed by [employer].  .  Two children.  Currently not smoking.  Does use nicotine patch.  Drinks alcohol infrequently.  No illicit drug use.  Family history includes father, alive, hearing loss and prostate cancer.  Mother, alive, with atrial fibrillation..  Multiple members with psoriasis.  Possible colon cancer in paternal grandfather.  Health maintenance includes tetanus booster November 2018, no recent flu vaccines, no previous COVID vaccines.    ADHD   Last visit June 2024  On Dexedrine Spansules 10 mg twice a day  Effective with symptoms controlled on present medication and dose helping compensate for deficits at work/school.  Duration is appropriate.    No mood instability, tics, or disordered sleep, or other apparent adverse effects.    Tolerating current treatment well.   No behaviors to suggest inappropriate use of prescribed medications.  [place] Board of Pharmacy Controlled Prescription Drug Monitoring database was queried and showed no activity to suggest abuse, diversion, or other inappropriate use of prescription medications.     Would like refill of Medrol Dosepak to have on hand for next exacerbation of low back pain    Otherwise doing well with no additional complaints or concerns today          Current Outpatient Medications:     dextroamphetamine sulfate (DEXEDRINE SPANSULE) 10 MG 24 hr capsule, Take 1 capsule (10 mg total) by mouth 2 (two) times a day., Disp: 60 capsule, Rfl: 0    [START ON 10/24/2024] dextroamphetamine sulfate (DEXEDRINE SPANSULE) 10 MG 24 hr capsule, Take 1 capsule (10 mg total) by mouth 2  "(two) times a day., Disp: 60 capsule, Rfl: 0    [START ON 11/21/2024] dextroamphetamine sulfate (DEXEDRINE SPANSULE) 10 MG 24 hr capsule, Take 1 capsule (10 mg total) by mouth 2 (two) times a day., Disp: 60 capsule, Rfl: 0    methylPREDNISolone (MEDROL DOSEPACK) 4 mg tablet, Take as directed on pack, Disp: 21 each, Rfl: 0    Review of Systems   Constitutional:  Negative for appetite change, fatigue and unexpected weight change.   Respiratory:  Negative for shortness of breath.    Cardiovascular:  Negative for chest pain, palpitations and leg swelling.   Gastrointestinal:  Negative for abdominal pain, constipation, diarrhea, nausea and vomiting.   Musculoskeletal:  Positive for back pain and myalgias. Negative for gait problem.   Neurological:  Negative for weakness, numbness and headaches.   Psychiatric/Behavioral:  Negative for agitation, behavioral problems, confusion, decreased concentration, dysphoric mood, hallucinations and sleep disturbance. The patient is not nervous/anxious and is not hyperactive.        Objective:   /64   Pulse 73   Temp 97.2 °F (36.2 °C)   Ht 6' 0.99" (1.854 m)   Wt 110.7 kg (244 lb)   BMI 32.20 kg/m²     Physical Exam  Vitals and nursing note reviewed.   Constitutional:       Appearance: Normal appearance. He is well-developed. He is obese.   HENT:      Right Ear: Tympanic membrane and ear canal normal.      Left Ear: Tympanic membrane and ear canal normal.      Nose: No congestion or rhinorrhea.      Mouth/Throat:      Pharynx: Oropharynx is clear. Uvula midline. No pharyngeal swelling, oropharyngeal exudate or posterior oropharyngeal erythema.   Neck:      Thyroid: No thyroid mass, thyromegaly or thyroid tenderness.   Cardiovascular:      Rate and Rhythm: Normal rate and regular rhythm.      Heart sounds: Normal heart sounds.   Pulmonary:      Effort: Pulmonary effort is normal.      Breath sounds: Normal breath sounds and air entry.   Abdominal:      General: There is no " distension.      Palpations: Abdomen is soft.      Tenderness: There is no abdominal tenderness.   Skin:     Findings: No rash.   Neurological:      Mental Status: He is alert.   Psychiatric:         Attention and Perception: Attention normal. He is attentive.         Mood and Affect: Mood and affect normal.         Behavior: Behavior normal. Behavior is not hyperactive. Behavior is cooperative.     CBC and CMP normal   Total cholesterol 161 triglycerides 125.  HDL 41.  LDL 95.  Ten year cardiovascular risk 1%.    A1c, TSH, PSA pending    CT calcium score less than 10 consistent with very little coronary heart disease risk.    Assessment:       ICD-10-CM ICD-9-CM   1. Routine general medical examination at a health care facility  Z00.00 V70.0   2. Attention deficit disorder (ADD) without hyperactivity  F98.8 314.00   3. Chronic right-sided low back pain with right-sided sciatica  M54.41 724.2    G89.29 724.3     338.29   4. Facet arthritis of lumbosacral region  M47.817 721.3   5. Elevated coronary artery calcium score  R93.1 414.00     Plan:   Routine general medical examination at a health care facility  Blood pressure normal.  BMI 32.  Remainder exam stable.    Send full executive Health letter when all test resulted   Colon cancer screening at 47 years old  Prostate cancer screening today   Tetanus booster up-to-date   Declines COVID booster   Plans to obtain flu vaccine through work    Attention deficit disorder (ADD) without hyperactivity  -     dextroamphetamine sulfate (DEXEDRINE SPANSULE) 10 MG 24 hr capsule; Take 1 capsule (10 mg total) by mouth 2 (two) times a day.  Dispense: 60 capsule; Refill: 0  -     dextroamphetamine sulfate (DEXEDRINE SPANSULE) 10 MG 24 hr capsule; Take 1 capsule (10 mg total) by mouth 2 (two) times a day.  Dispense: 60 capsule; Refill: 0  -     dextroamphetamine sulfate (DEXEDRINE SPANSULE) 10 MG 24 hr capsule; Take 1 capsule (10 mg total) by mouth 2 (two) times a day.  Dispense:  60 capsule; Refill: 0  ADHD stable, no side effects, symptoms well controlled on current medication  Continue stimulant at present dose    Chronic right-sided low back pain with right-sided sciatica  Facet arthritis of lumbosacral region  -     methylPREDNISolone (MEDROL DOSEPACK) 4 mg tablet; Take as directed on pack  Dispense: 21 each; Refill: 0    Elevated coronary artery calcium score  Score elevated but less than 10   Consistent with very little coronary heart disease risk   Lipid panel with 10 year cardiovascular risk less than 1%   Statin not indicated at this time     RTC in 3 months  Telemedicine visit appropriate if patient prefers

## 2024-09-26 NOTE — LETTER
September 26, 2024    Theron Manrique  [address] [place] [address]             [address]  [address] [facility]  [place] [address]  Phone: [phone]  Fax: [phone] Dear Mr. Manrique,    On September 26, 2024, it was a pleasure to see you again you and perform your Executive Health evaluation. At the time of this visit, you are  44 years old. You denied any specific complaints or concerns during todays visit.      YOUR PAST MEDICAL HISTORY includes ADHD and lumbosacral facet arthritis.    YOUR PAST SURGICAL HISTORY includes a tonsillectomy, wisdom teeth extractions, and an excision of a benign skin lesion.    YOUR CURRENT MEDICATIONS include Dexedrine Spansules 10 mg twice a day.    You do not have ANY KNOWN DRUG ALLERGIES.    YOUR SOCIAL HISTORY includes employment by [employer]. You are  with 2 children. You drink alcohol infrequently. You quit smoking, but due use nicotine pouches. You denied any illicit drug use.      YOUR FAMILY HISTORY includes your father, alive, with hearing loss and prostate cancer. Your mother, alive, with atrial fibrillation. Multiple family members have psoriasis. Your paternal grandfather possibly had colon cancer.    YOUR ROUTINE HEALTH MAINTENANCE includes a tetanus booster in November 2018, a reported flu vaccine in October 2023, and no previous COVID vaccines.    PHYSICAL EXAMINATION: You are 5 feet 11 inches tall, weighing 244 pounds with a body mass index of 32.  You remain in the Obese category. Your blood pressure is 109/64. Your heart rate is 73 beats per minute. All of these are normal values. Other than your Obesity, your physical examination did not reveal any additional significant abnormal findings.    YOUR BLOOD WORK AND ADDITIONAL TESTS: Reveal normal white cell counts, red cell counts, and platelet levels. You are not Anemic. Your glucose, kidney, liver, and electrolyte tests are normal. Your total cholesterol  is 161. Your triglycerides are 125. Your HDL is 41. Your LDL is 95. Based on these numbers, your 10-year risk of heart attack or stroke is 1%. Your hemoglobin A1c is 5.3%, which is in a normal range. You do not have Prediabetes or Diabetes. Your TSH is 1.394, which is in a normal range. You do not have an active Thyroid problem.  Your serum PSA level is 0.53, which is in a normal range.  You do not have any suspicions for prostate cancer.    Your CT coronary calcium score is 2 and consistent with very little coronary heart disease risk.    In summary, you are overall in very good health. You remain Obese. This puts you at increased risk for medical problems in the future. Lifestyle modifications to promote weight loss, like regular physical activity and decreased daily caloric intake, are encouraged. Your CT calcium score is elevated but less than 10.  Your 10-year risk of heart attack or stroke is low. Daily aspirin or cholesterol medications are not recommended. Since your labs are stable, you may safely continue your current stimulant medication for ADHD.    Based on the [place] Preventive Task Force recommendations, you should receive yearly Influenza vaccinations. You should have a Tetanus booster every 10 years. You should complete the COVID vaccine series. You should start screening for Colon cancer at 45 years old.     It was a pleasure to see you again you and perform this Executive Health evaluation. If I can be of any further assistance, or if you have any additional questions or concerns, please do not hesitate to let me know.    Sincerely,      Pete Joe MD, FAAFP

## 2024-12-23 ENCOUNTER — OFFICE VISIT (OUTPATIENT)
Dept: INTERNAL MEDICINE | Facility: CLINIC | Age: 44
End: 2024-12-23
Payer: COMMERCIAL

## 2024-12-23 DIAGNOSIS — F98.8 ATTENTION DEFICIT DISORDER (ADD) WITHOUT HYPERACTIVITY: Primary | ICD-10-CM

## 2024-12-23 DIAGNOSIS — J06.9 URTI (ACUTE UPPER RESPIRATORY INFECTION): ICD-10-CM

## 2024-12-23 PROCEDURE — 1160F RVW MEDS BY RX/DR IN RCRD: CPT | Mod: CPTII,95,, | Performed by: FAMILY MEDICINE

## 2024-12-23 PROCEDURE — 3044F HG A1C LEVEL LT 7.0%: CPT | Mod: CPTII,95,, | Performed by: FAMILY MEDICINE

## 2024-12-23 PROCEDURE — 99214 OFFICE O/P EST MOD 30 MIN: CPT | Mod: 95,,, | Performed by: FAMILY MEDICINE

## 2024-12-23 PROCEDURE — 1159F MED LIST DOCD IN RCRD: CPT | Mod: CPTII,95,, | Performed by: FAMILY MEDICINE

## 2024-12-23 RX ORDER — DEXTROAMPHETAMINE SULFATE 10 MG/1
10 CAPSULE, EXTENDED RELEASE ORAL 2 TIMES DAILY
Qty: 60 CAPSULE | Refills: 0 | Status: SHIPPED | OUTPATIENT
Start: 2024-12-23

## 2024-12-23 RX ORDER — DEXTROAMPHETAMINE SULFATE 10 MG/1
10 CAPSULE, EXTENDED RELEASE ORAL 2 TIMES DAILY
Qty: 60 CAPSULE | Refills: 0 | Status: SHIPPED | OUTPATIENT
Start: 2025-02-17

## 2024-12-23 RX ORDER — PROMETHAZINE HYDROCHLORIDE AND DEXTROMETHORPHAN HYDROBROMIDE 6.25; 15 MG/5ML; MG/5ML
5 SYRUP ORAL EVERY 6 HOURS PRN
Qty: 180 ML | Refills: 0 | Status: SHIPPED | OUTPATIENT
Start: 2024-12-23

## 2024-12-23 RX ORDER — DEXTROAMPHETAMINE SULFATE 10 MG/1
10 CAPSULE, EXTENDED RELEASE ORAL 2 TIMES DAILY
Qty: 60 CAPSULE | Refills: 0 | Status: SHIPPED | OUTPATIENT
Start: 2025-01-20

## 2024-12-23 NOTE — PROGRESS NOTES
Subjective:   Patient ID: Theron Manrique is a 44 y.o. male.  Chief Complaint:  ADHD and URI    The patient location is: [facility]  The chief complaint leading to consultation is: ADHD Follow up and URTI    Visit type: audiovisual    Face to Face time with patient: 20 minutes  30 minutes of total time spent on the encounter, which includes face to face time and non-face to face time preparing to see the patient (eg, review of tests), Obtaining and/or reviewing separately obtained history, Documenting clinical information in the electronic or other health record, Independently interpreting results (not separately reported) and communicating results to the patient/family/caregiver, or Care coordination (not separately reported).     Each patient to whom he or she provides medical services by telemedicine is:  (1) informed of the relationship between the physician and patient and the respective role of any other health care provider with respect to management of the patient; and (2) notified that he or she may decline to receive medical services by telemedicine and may withdraw from such care at any time.    ADHD follow-up  Last visit September 2024 for executive Health evaluation  CBC, CMP, lipids, TSH, A1c all acceptable  Continued on Dexedrine 10 mg Spansule twice a day  Remains effective with symptoms controlled on present medication and dose helping compensate for deficits at work/school.  Duration is appropriate.    No mood instability, tics, or disordered sleep, or other apparent adverse effects.    Tolerating current treatment well.   No behaviors to suggest inappropriate use of prescribed medications.  [place] [employer] Controlled Prescription Drug Monitoring database was queried and showed no activity to suggest abuse, diversion, or other inappropriate use of prescription medications.     Does complain of 3-5 day duration of URTI symptoms  No previous COVID vaccines   No up-to-date flu vaccine        URI   This is a new problem. The current episode started in the past 7 days. The problem has been unchanged. There has been no fever. The cough is Non-productive. Associated symptoms include congestion, coughing and sinus pain. Pertinent negatives include no abdominal pain, chest pain, conjunctivitis, diarrhea, dysuria, ear pain, headaches, joint pain, joint swelling, nausea, neck pain, plugged ear sensation, rash, rhinorrhea, sneezing, sore throat, swollen glands, vomiting or wheezing. He has tried nothing for the symptoms.     Review of Systems   Constitutional:  Negative for activity change, appetite change, chills, fatigue, fever and unexpected weight change.   HENT:  Positive for congestion, postnasal drip, sinus pressure and sinus pain. Negative for dental problem, drooling, ear discharge, ear pain, facial swelling, hearing loss, mouth sores, nosebleeds, rhinorrhea, sneezing, sore throat, tinnitus, trouble swallowing and voice change.    Eyes:  Negative for photophobia, pain, discharge, redness, itching and visual disturbance.   Respiratory:  Positive for cough. Negative for chest tightness, shortness of breath, wheezing and stridor.    Cardiovascular:  Negative for chest pain, palpitations and leg swelling.   Gastrointestinal:  Negative for abdominal pain, blood in stool, constipation, diarrhea, nausea and vomiting.   Endocrine: Negative for polydipsia and polyuria.   Genitourinary:  Negative for difficulty urinating, dysuria, hematuria and urgency.   Musculoskeletal:  Negative for arthralgias, joint pain, joint swelling, myalgias, neck pain and neck stiffness.   Skin:  Negative for rash.   Neurological:  Negative for dizziness, weakness, light-headedness and headaches.   Hematological:  Negative for adenopathy.   Psychiatric/Behavioral:  Negative for agitation, behavioral problems, confusion, decreased concentration, dysphoric mood, hallucinations and sleep disturbance. The patient is not  nervous/anxious and is not hyperactive.        Objective:   There were no vitals taken for this visit.    Physical Exam  Constitutional:       Appearance: Normal appearance.   HENT:      Nose: Mucosal edema, congestion and rhinorrhea present.      Right Sinus: No maxillary sinus tenderness or frontal sinus tenderness.      Left Sinus: No maxillary sinus tenderness or frontal sinus tenderness.   Psychiatric:         Mood and Affect: Mood and affect normal.         Behavior: Behavior normal.         Thought Content: Thought content normal.         Judgment: Judgment normal.       Assessment:       ICD-10-CM ICD-9-CM   1. Attention deficit disorder (ADD) without hyperactivity  F98.8 314.00   2. URTI (acute upper respiratory infection)  J06.9 465.9     Plan:   Attention deficit disorder (ADD) without hyperactivity  -     dextroamphetamine sulfate (DEXEDRINE SPANSULE) 10 MG 24 hr capsule; Take 1 capsule (10 mg total) by mouth 2 (two) times a day.  Dispense: 60 capsule; Refill: 0  -     dextroamphetamine sulfate (DEXEDRINE SPANSULE) 10 MG 24 hr capsule; Take 1 capsule (10 mg total) by mouth 2 (two) times a day.  Dispense: 60 capsule; Refill: 0  -     dextroamphetamine sulfate (DEXEDRINE SPANSULE) 10 MG 24 hr capsule; Take 1 capsule (10 mg total) by mouth 2 (two) times a day.  Dispense: 60 capsule; Refill: 0  ADHD stable, no side effects, symptoms well controlled on current medication  Continue stimulant at present dose    URTI (acute upper respiratory infection)  -     promethazine-dextromethorphan (PROMETHAZINE-DM) 6.25-15 mg/5 mL Syrp; Take 5 mLs by mouth every 6 (six) hours as needed (Cough).  Dispense: 180 mL; Refill: 0  Based on history, review of systems, and limited exam likely viral rhinosinusitis   Discussed even if COVID or flu, symptoms greater than 3-5 day duration and low risk severity score so antivirals not indicated  Take Phenergan DM as needed for cough and congestion   If any fever, symptoms greater than  10 days, corona rhinitis, or worsening/double sickening call for antibiotic prescription  Patient expresses agreement and understanding to the above plan    RTC in 3 months  Telemedicine visit appropriate if patient prefers

## 2025-03-25 ENCOUNTER — OFFICE VISIT (OUTPATIENT)
Dept: INTERNAL MEDICINE | Facility: CLINIC | Age: 45
End: 2025-03-25
Payer: COMMERCIAL

## 2025-03-25 DIAGNOSIS — F98.8 ATTENTION DEFICIT DISORDER (ADD) WITHOUT HYPERACTIVITY: ICD-10-CM

## 2025-03-25 RX ORDER — DEXTROAMPHETAMINE SULFATE 10 MG/1
10 CAPSULE, EXTENDED RELEASE ORAL 2 TIMES DAILY
Qty: 60 CAPSULE | Refills: 0 | Status: SHIPPED | OUTPATIENT
Start: 2025-03-25

## 2025-03-25 RX ORDER — DEXTROAMPHETAMINE SULFATE 10 MG/1
10 CAPSULE, EXTENDED RELEASE ORAL 2 TIMES DAILY
Qty: 60 CAPSULE | Refills: 0 | Status: SHIPPED | OUTPATIENT
Start: 2025-05-20

## 2025-03-25 RX ORDER — DEXTROAMPHETAMINE SULFATE 10 MG/1
10 CAPSULE, EXTENDED RELEASE ORAL 2 TIMES DAILY
Qty: 60 CAPSULE | Refills: 0 | Status: SHIPPED | OUTPATIENT
Start: 2025-04-22

## 2025-03-25 NOTE — PROGRESS NOTES
Subjective:   Patient ID: Theron Manrique is a 45 y.o. male.  Chief Complaint:  ADHD    The patient location is: [place]  The chief complaint leading to consultation is: ADHD Follow up     Visit type: audiovisual     Face to Face time with patient: 20 minutes  30 minutes of total time spent on the encounter, which includes face to face time and non-face to face time preparing to see the patient (eg, review of tests), Obtaining and/or reviewing separately obtained history, Documenting clinical information in the electronic or other health record, Independently interpreting results (not separately reported) and communicating results to the patient/family/caregiver, or Care coordination (not separately reported).      Each patient to whom he or she provides medical services by telemedicine is:  (1) informed of the relationship between the physician and patient and the respective role of any other health care provider with respect to management of the patient; and (2) notified that he or she may decline to receive medical services by telemedicine and may withdraw from such care at any time.     ADHD follow-up  Last [facility] health evaluation September 2024 CBC, CMP, lipids, TSH, A1c all acceptable  Last visit December 2024 via telemedicine for ADHD follow-up   Continued on Dexedrine 10 mg Spansule twice a day  Remains effective with symptoms controlled on present medication and dose helping compensate for deficits at work/school.  Duration is appropriate.    No mood instability, tics, or disordered sleep, or other apparent adverse effects.    Tolerating current treatment well.   No behaviors to suggest inappropriate use of prescribed medications.  [place] Board [employer] Controlled Prescription Drug Monitoring database was queried and showed no activity to suggest abuse, diversion, or other inappropriate use of prescription medications.     No new complaints or concerns today    Review of Systems    Constitutional:  Negative for activity change, appetite change, fatigue and unexpected weight change.   HENT:  Negative for hearing loss, rhinorrhea and trouble swallowing.    Eyes:  Negative for discharge and visual disturbance.   Respiratory:  Negative for chest tightness, shortness of breath and wheezing.    Cardiovascular:  Negative for chest pain, palpitations and leg swelling.   Gastrointestinal:  Negative for abdominal pain, blood in stool, constipation, diarrhea, nausea and vomiting.   Endocrine: Negative for polydipsia and polyuria.   Genitourinary:  Negative for difficulty urinating, hematuria and urgency.   Musculoskeletal:  Negative for arthralgias, joint swelling and neck pain.   Neurological:  Negative for weakness and headaches.   Psychiatric/Behavioral:  Negative for agitation, behavioral problems, confusion, decreased concentration, dysphoric mood, hallucinations and sleep disturbance. The patient is not nervous/anxious and is not hyperactive.      Objective:     Physical Exam  Constitutional:       Appearance: Normal appearance.   Psychiatric:         Attention and Perception: Attention and perception normal. He is attentive.         Mood and Affect: Mood and affect normal.         Speech: Speech normal.         Behavior: Behavior normal. Behavior is not hyperactive. Behavior is cooperative.         Thought Content: Thought content normal.         Cognition and Memory: Cognition and memory normal.         Judgment: Judgment normal.       Assessment:       ICD-10-CM ICD-9-CM   1. Attention deficit disorder (ADD) without hyperactivity  F98.8 314.00     Plan:   Attention deficit disorder (ADD) without hyperactivity  -     dextroamphetamine sulfate (DEXEDRINE SPANSULE) 10 MG 24 hr capsule; Take 1 capsule (10 mg total) by mouth 2 (two) times a day.  Dispense: 60 capsule; Refill: 0  -     dextroamphetamine sulfate (DEXEDRINE SPANSULE) 10 MG 24 hr capsule; Take 1 capsule (10 mg total) by mouth 2 (two)  times a day.  Dispense: 60 capsule; Refill: 0  -     dextroamphetamine sulfate (DEXEDRINE SPANSULE) 10 MG 24 hr capsule; Take 1 capsule (10 mg total) by mouth 2 (two) times a day.  Dispense: 60 capsule; Refill: 0    ADHD stable, no side effects, symptoms well controlled on current medication  Continue stimulant at present dose    RTC in 3 months  Telemedicine visit appropriate if patient prefers

## 2025-06-11 ENCOUNTER — OFFICE VISIT (OUTPATIENT)
Dept: INTERNAL MEDICINE | Facility: CLINIC | Age: 45
End: 2025-06-11
Payer: COMMERCIAL

## 2025-06-11 VITALS
OXYGEN SATURATION: 97 % | WEIGHT: 241.38 LBS | SYSTOLIC BLOOD PRESSURE: 134 MMHG | BODY MASS INDEX: 31.99 KG/M2 | HEART RATE: 80 BPM | DIASTOLIC BLOOD PRESSURE: 78 MMHG | HEIGHT: 73 IN

## 2025-06-11 DIAGNOSIS — Z00.00 ROUTINE GENERAL MEDICAL EXAMINATION AT A HEALTH CARE FACILITY: Primary | ICD-10-CM

## 2025-06-11 DIAGNOSIS — F98.8 ATTENTION DEFICIT DISORDER (ADD) WITHOUT HYPERACTIVITY: ICD-10-CM

## 2025-06-11 PROCEDURE — 3078F DIAST BP <80 MM HG: CPT | Mod: CPTII,S$GLB,, | Performed by: FAMILY MEDICINE

## 2025-06-11 PROCEDURE — 1160F RVW MEDS BY RX/DR IN RCRD: CPT | Mod: CPTII,S$GLB,, | Performed by: FAMILY MEDICINE

## 2025-06-11 PROCEDURE — 99213 OFFICE O/P EST LOW 20 MIN: CPT | Mod: S$GLB,,, | Performed by: FAMILY MEDICINE

## 2025-06-11 PROCEDURE — 1159F MED LIST DOCD IN RCRD: CPT | Mod: CPTII,S$GLB,, | Performed by: FAMILY MEDICINE

## 2025-06-11 PROCEDURE — 3008F BODY MASS INDEX DOCD: CPT | Mod: CPTII,S$GLB,, | Performed by: FAMILY MEDICINE

## 2025-06-11 PROCEDURE — 3075F SYST BP GE 130 - 139MM HG: CPT | Mod: CPTII,S$GLB,, | Performed by: FAMILY MEDICINE

## 2025-06-11 PROCEDURE — 99999 PR PBB SHADOW E&M-EST. PATIENT-LVL III: CPT | Mod: PBBFAC,,, | Performed by: FAMILY MEDICINE

## 2025-06-11 RX ORDER — DEXTROAMPHETAMINE SULFATE 10 MG/1
10 CAPSULE, EXTENDED RELEASE ORAL 2 TIMES DAILY
Qty: 60 CAPSULE | Refills: 0 | Status: SHIPPED | OUTPATIENT
Start: 2025-07-22

## 2025-06-11 RX ORDER — DEXTROAMPHETAMINE SULFATE 10 MG/1
10 CAPSULE, EXTENDED RELEASE ORAL 2 TIMES DAILY
Qty: 60 CAPSULE | Refills: 0 | Status: SHIPPED | OUTPATIENT
Start: 2025-06-24

## 2025-06-11 RX ORDER — DEXTROAMPHETAMINE SULFATE 10 MG/1
10 CAPSULE, EXTENDED RELEASE ORAL 2 TIMES DAILY
Qty: 60 CAPSULE | Refills: 0 | Status: SHIPPED | OUTPATIENT
Start: 2025-08-19

## 2025-06-11 NOTE — PROGRESS NOTES
Subjective:   Patient ID: Theron Manrique is a 45 y.o. male.  Chief Complaint:  Medication Refill    Presents for ADHD follow-up/medication refill    Last annual exam/executive health evaluation September 2024   Last clinic visit March 2025 via telemedicine for ADHD follow-up  Continued on Dexedrine 10 mg Spansule twice a day  Remains effective with symptoms controlled on present medication and dose helping compensate for deficits at work/school.  Duration is appropriate.    No mood instability, tics, or disordered sleep, or other apparent adverse effects.    Tolerating current treatment well.   No behaviors to suggest inappropriate use of prescribed medications.  [place] Board [employer]acy Controlled Prescription Drug Monitoring database was queried and showed no activity to suggest abuse, diversion, or other inappropriate use of prescription medications.      No new complaints or concerns today    Review of Systems   Constitutional:  Negative for activity change, appetite change, fatigue and unexpected weight change.   HENT:  Negative for hearing loss, rhinorrhea and trouble swallowing.    Eyes:  Negative for discharge and visual disturbance.   Respiratory:  Negative for chest tightness, shortness of breath and wheezing.    Cardiovascular:  Negative for chest pain, palpitations and leg swelling.   Gastrointestinal:  Negative for abdominal pain, blood in stool, constipation, diarrhea, nausea and vomiting.   Endocrine: Negative for polydipsia and polyuria.   Genitourinary:  Negative for difficulty urinating, hematuria and urgency.   Musculoskeletal:  Negative for arthralgias, joint swelling and neck pain.   Neurological:  Negative for weakness and headaches.   Psychiatric/Behavioral:  Negative for agitation, behavioral problems, confusion, decreased concentration, dysphoric mood, hallucinations and sleep disturbance. The patient is not nervous/anxious and is not hyperactive.      Objective:   /78 (BP  "Location: Right arm, Patient Position: Sitting)   Pulse 80   Ht 6' 0.99" (1.854 m)   Wt 109.5 kg (241 lb 6.5 oz)   SpO2 97%   BMI 31.86 kg/m²     Physical Exam  Vitals and nursing note reviewed.   Constitutional:       Appearance: He is well-developed and normal weight.   Cardiovascular:      Rate and Rhythm: Normal rate and regular rhythm.      Heart sounds: Normal heart sounds.   Pulmonary:      Effort: Pulmonary effort is normal.      Breath sounds: Normal breath sounds and air entry.   Abdominal:      General: There is no distension.      Palpations: Abdomen is soft.      Tenderness: There is no abdominal tenderness.   Neurological:      Mental Status: He is alert.   Psychiatric:         Attention and Perception: Attention normal. He is attentive.         Mood and Affect: Mood and affect normal.         Behavior: Behavior normal. Behavior is not hyperactive. Behavior is cooperative.       Assessment:       ICD-10-CM ICD-9-CM   1. Attention deficit disorder (ADD) without hyperactivity  F98.8 314.00     Plan:   Attention deficit disorder (ADD) without hyperactivity  -     dextroamphetamine sulfate (DEXEDRINE SPANSULE) 10 MG 24 hr capsule; Take 1 capsule (10 mg total) by mouth 2 (two) times a day.  Dispense: 60 capsule; Refill: 0  -     dextroamphetamine sulfate (DEXEDRINE SPANSULE) 10 MG 24 hr capsule; Take 1 capsule (10 mg total) by mouth 2 (two) times a day.  Dispense: 60 capsule; Refill: 0  -     dextroamphetamine sulfate (DEXEDRINE SPANSULE) 10 MG 24 hr capsule; Take 1 capsule (10 mg total) by mouth 2 (two) times a day.  Dispense: 60 capsule; Refill: 0    ADHD stable, no side effects, symptoms well controlled on current medication  Continue stimulant at present dose  Return to clinic 3 months for executive Health evaluation as scheduled or sooner as needed        "

## 2025-07-08 ENCOUNTER — OFFICE VISIT (OUTPATIENT)
Dept: INTERNAL MEDICINE | Facility: CLINIC | Age: 45
End: 2025-07-08
Payer: COMMERCIAL

## 2025-07-08 DIAGNOSIS — M54.50 ACUTE BILATERAL LOW BACK PAIN WITHOUT SCIATICA: ICD-10-CM

## 2025-07-08 DIAGNOSIS — M47.817 FACET ARTHRITIS OF LUMBOSACRAL REGION: ICD-10-CM

## 2025-07-08 PROCEDURE — 98006 SYNCH AUDIO-VIDEO EST MOD 30: CPT | Mod: 95,,, | Performed by: FAMILY MEDICINE

## 2025-07-08 PROCEDURE — 1160F RVW MEDS BY RX/DR IN RCRD: CPT | Mod: CPTII,95,, | Performed by: FAMILY MEDICINE

## 2025-07-08 PROCEDURE — 1159F MED LIST DOCD IN RCRD: CPT | Mod: CPTII,95,, | Performed by: FAMILY MEDICINE

## 2025-07-08 RX ORDER — CYCLOBENZAPRINE HCL 10 MG
10 TABLET ORAL 3 TIMES DAILY
Qty: 30 TABLET | Refills: 0 | Status: SHIPPED | OUTPATIENT
Start: 2025-07-08

## 2025-07-08 RX ORDER — METHYLPREDNISOLONE 4 MG/1
TABLET ORAL
Qty: 21 EACH | Refills: 0 | Status: SHIPPED | OUTPATIENT
Start: 2025-07-08

## 2025-07-08 NOTE — PROGRESS NOTES
Subjective:   Patient ID: Theron Manrique is a 45 y.o. male.  Chief Complaint:  Back Pain    The patient location is: [place]  The chief complaint leading to consultation is: Back Pain    Visit type: audiovisual    Face to Face time with patient: 15 minutes  20 minutes of total time spent on the encounter, which includes face to face time and non-face to face time preparing to see the patient (eg, review of tests), Obtaining and/or reviewing separately obtained history, Documenting clinical information in the electronic or other health record, Independently interpreting results (not separately reported) and communicating results to the patient/family/caregiver, or Care coordination (not separately reported).     Each patient to whom he or she provides medical services by telemedicine is:  (1) informed of the relationship between the physician and patient and the respective role of any other health care provider with respect to management of the patient; and (2) notified that he or she may decline to receive medical services by telemedicine and may withdraw from such care at any time.    Presents for evaluation of recurrent low back pain  24-48 hour duration  Started after moving washing machine at home   This pain pattern is different than previous pain pattern and more bilateral lower muscle spasm related   Does have history of facet arthritis L4, L5, and S1    Back Pain  This is a recurrent problem. The current episode started yesterday. The problem occurs constantly. The problem is unchanged. The pain is present in the lumbar spine. The quality of the pain is described as shooting and stabbing. The pain does not radiate. The pain is at a severity of 6/10. The pain is mild. The pain is The same all the time. The symptoms are aggravated by position, bending and twisting. Stiffness is present All day. Pertinent negatives include no abdominal pain, bladder incontinence, bowel incontinence, chest pain, dysuria,  fever, headaches, leg pain, numbness, paresis, paresthesias, pelvic pain, perianal numbness, tingling, weakness or weight loss. Risk factors include obesity and poor posture. He has tried NSAIDs and bed rest for the symptoms. The treatment provided mild relief.     Review of Systems   Constitutional:  Negative for chills, fever and weight loss.   Cardiovascular:  Negative for chest pain.   Gastrointestinal:  Negative for abdominal distention, abdominal pain and bowel incontinence.   Genitourinary:  Negative for bladder incontinence, dysuria, hematuria and pelvic pain.   Musculoskeletal:  Positive for back pain and myalgias. Negative for arthralgias, gait problem, joint swelling, neck pain and neck stiffness.   Skin:  Negative for rash.   Neurological:  Negative for tingling, weakness, numbness, headaches and paresthesias.   Psychiatric/Behavioral:  Negative for sleep disturbance.        Objective:     Physical Exam  Vitals and nursing note reviewed.   Constitutional:       Appearance: Normal appearance. He is well-developed and normal weight.   Cardiovascular:      Rate and Rhythm: Normal rate and regular rhythm.   Pulmonary:      Effort: Pulmonary effort is normal.   Musculoskeletal:      Right lower leg: No edema.      Left lower leg: No edema.      Comments:   Decreased speed with all ranges of motion due to pain   Pain significantly increased with flexion and extension   Skin:     Findings: No rash.   Psychiatric:         Mood and Affect: Mood and affect normal.       Assessment:       ICD-10-CM ICD-9-CM   1. Acute bilateral low back pain without sciatica  M54.50 724.2     338.19   2. Facet arthritis of lumbosacral region  M47.817 721.3     Plan:   Acute bilateral low back pain without sciatica  Facet arthritis of lumbosacral region  -     methylPREDNISolone (MEDROL DOSEPACK) 4 mg tablet; Take as directed on pack  Dispense: 21 each; Refill: 0  -     cyclobenzaprine (FLEXERIL) 10 MG tablet; Take 1 tablet (10 mg  total) by mouth 3 (three) times daily.  Dispense: 30 tablet; Refill: 0    Based on history, review of systems, and limited exam pain more soft tissue muscle related from sprain/strain  Does have history of facet arthritis   Last treatment with steroid pill pack greater than 6 months ago   [place] with Medrol Dosepak   Okay remain off gabapentin since does not appear to be neuropathic  Flexeril 10 mg 3 times a day  Expect significant improvement in next 48-72 hours with conservative treatment   If no improvement, or any worsening despite above, call for physical therapy referral  Patient expresses agreement understanding to the above plan   Return to clinic as scheduled/as needed